# Patient Record
Sex: FEMALE | Race: WHITE | Employment: FULL TIME | URBAN - METROPOLITAN AREA
[De-identification: names, ages, dates, MRNs, and addresses within clinical notes are randomized per-mention and may not be internally consistent; named-entity substitution may affect disease eponyms.]

---

## 2017-02-15 PROBLEM — N60.19: Status: ACTIVE | Noted: 2017-02-15

## 2022-07-09 ENCOUNTER — HOSPITAL ENCOUNTER (EMERGENCY)
Age: 47
Discharge: HOME | End: 2022-07-09
Attending: EMERGENCY MEDICINE
Payer: COMMERCIAL

## 2022-07-09 VITALS
TEMPERATURE: 98 F | SYSTOLIC BLOOD PRESSURE: 130 MMHG | RESPIRATION RATE: 16 BRPM | BODY MASS INDEX: 23.32 KG/M2 | HEIGHT: 65 IN | WEIGHT: 140 LBS | OXYGEN SATURATION: 100 % | HEART RATE: 81 BPM | DIASTOLIC BLOOD PRESSURE: 72 MMHG

## 2022-07-09 DIAGNOSIS — N39.0 URINARY TRACT INFECTION WITHOUT HEMATURIA, SITE UNSPECIFIED: Primary | ICD-10-CM

## 2022-07-09 LAB
B-HCG UR QL: NEGATIVE
BILIRUB UR QL STRIP.AUTO: NEGATIVE
CLARITY UR REFRACT.AUTO: CLEAR
COLOR UR AUTO: YELLOW
GLUCOSE UR STRIP.AUTO-MCNC: NEGATIVE MG/DL
KETONES UR STRIP.AUTO-MCNC: NEGATIVE MG/DL
LEUKOCYTE ESTERASE UR QL STRIP.AUTO: NEGATIVE
NITRITE UR QL STRIP.AUTO: POSITIVE
PH UR STRIP.AUTO: 6 [PH] (ref 5–8)
PROT UR STRIP.AUTO-MCNC: NEGATIVE MG/DL
RBC UR QL AUTO: NEGATIVE
SP GR UR STRIP.AUTO: <=1.005 (ref 1–1.03)
UROBILINOGEN UR STRIP.AUTO-MCNC: 0.2 MG/DL

## 2022-07-09 PROCEDURE — 87086 URINE CULTURE/COLONY COUNT: CPT

## 2022-07-09 PROCEDURE — 99283 EMERGENCY DEPT VISIT LOW MDM: CPT

## 2022-07-09 PROCEDURE — 87086 URINE CULTURE/COLONY COUNT: CPT | Performed by: EMERGENCY MEDICINE

## 2022-07-09 PROCEDURE — 81001 URINALYSIS AUTO W/SCOPE: CPT

## 2022-07-09 PROCEDURE — 81001 URINALYSIS AUTO W/SCOPE: CPT | Performed by: EMERGENCY MEDICINE

## 2022-07-09 PROCEDURE — 81025 URINE PREGNANCY TEST: CPT

## 2022-07-09 RX ORDER — CIPROFLOXACIN 500 MG/1
500 TABLET, FILM COATED ORAL 2 TIMES DAILY
Qty: 10 TABLET | Refills: 0 | Status: SHIPPED | OUTPATIENT
Start: 2022-07-09 | End: 2022-07-14

## 2022-07-09 NOTE — ED INITIAL ASSESSMENT (HPI)
PT to the ED for evaluation of urgency, frequency and dysuria that started this afternoon. No fevers, no flank pain.

## 2022-09-20 ENCOUNTER — HOSPITAL ENCOUNTER (EMERGENCY)
Age: 47
Discharge: HOME | End: 2022-09-20
Attending: EMERGENCY MEDICINE

## 2022-09-20 VITALS
RESPIRATION RATE: 18 BRPM | BODY MASS INDEX: 23 KG/M2 | WEIGHT: 140 LBS | SYSTOLIC BLOOD PRESSURE: 115 MMHG | HEART RATE: 73 BPM | TEMPERATURE: 98 F | OXYGEN SATURATION: 98 % | DIASTOLIC BLOOD PRESSURE: 50 MMHG

## 2022-09-20 DIAGNOSIS — N30.00 ACUTE CYSTITIS WITHOUT HEMATURIA: ICD-10-CM

## 2022-09-20 DIAGNOSIS — R33.9 URINARY RETENTION: Primary | ICD-10-CM

## 2022-09-20 LAB
BILIRUB UR QL STRIP.AUTO: NEGATIVE
CLARITY UR REFRACT.AUTO: CLEAR
COLOR UR AUTO: YELLOW
GLUCOSE UR STRIP.AUTO-MCNC: NEGATIVE MG/DL
KETONES UR STRIP.AUTO-MCNC: NEGATIVE MG/DL
LEUKOCYTE ESTERASE UR QL STRIP.AUTO: NEGATIVE
NITRITE UR QL STRIP.AUTO: NEGATIVE
PH UR STRIP.AUTO: 5.5 [PH] (ref 5–8)
PROT UR STRIP.AUTO-MCNC: NEGATIVE MG/DL
SP GR UR STRIP.AUTO: 1.01 (ref 1–1.03)
UROBILINOGEN UR STRIP.AUTO-MCNC: 0.2 MG/DL

## 2022-09-20 PROCEDURE — 99283 EMERGENCY DEPT VISIT LOW MDM: CPT

## 2022-09-20 PROCEDURE — 51702 INSERT TEMP BLADDER CATH: CPT

## 2022-09-20 PROCEDURE — 81001 URINALYSIS AUTO W/SCOPE: CPT | Performed by: EMERGENCY MEDICINE

## 2022-09-20 PROCEDURE — 99284 EMERGENCY DEPT VISIT MOD MDM: CPT

## 2022-09-20 RX ORDER — CEPHALEXIN 500 MG/1
500 CAPSULE ORAL 4 TIMES DAILY
Qty: 28 CAPSULE | Refills: 0 | Status: SHIPPED | OUTPATIENT
Start: 2022-09-20 | End: 2022-09-27

## 2023-04-29 ENCOUNTER — HOSPITAL ENCOUNTER (EMERGENCY)
Age: 48
Discharge: LEFT BEFORE DISCHARGE | End: 2023-04-30
Attending: EMERGENCY MEDICINE
Payer: COMMERCIAL

## 2023-04-29 DIAGNOSIS — N85.8 UTERINE MASS: ICD-10-CM

## 2023-04-29 DIAGNOSIS — R19.03 RIGHT LOWER QUADRANT ABDOMINAL MASS: ICD-10-CM

## 2023-04-29 DIAGNOSIS — R33.9 URINARY RETENTION: Primary | ICD-10-CM

## 2023-04-29 DIAGNOSIS — D64.9 ANEMIA, UNSPECIFIED TYPE: ICD-10-CM

## 2023-04-29 LAB
BILIRUB UR QL STRIP.AUTO: NEGATIVE
CLARITY UR REFRACT.AUTO: CLEAR
COLOR UR AUTO: YELLOW
GLUCOSE UR STRIP.AUTO-MCNC: NEGATIVE MG/DL
KETONES UR STRIP.AUTO-MCNC: NEGATIVE MG/DL
LEUKOCYTE ESTERASE UR QL STRIP.AUTO: NEGATIVE
NITRITE UR QL STRIP.AUTO: NEGATIVE
PH UR STRIP.AUTO: 5 [PH] (ref 5–8)
PROT UR STRIP.AUTO-MCNC: NEGATIVE MG/DL
SP GR UR STRIP.AUTO: <=1.005 (ref 1–1.03)
UROBILINOGEN UR STRIP.AUTO-MCNC: 0.2 MG/DL

## 2023-04-29 PROCEDURE — 36415 COLL VENOUS BLD VENIPUNCTURE: CPT

## 2023-04-29 PROCEDURE — 81001 URINALYSIS AUTO W/SCOPE: CPT | Performed by: EMERGENCY MEDICINE

## 2023-04-29 PROCEDURE — 51702 INSERT TEMP BLADDER CATH: CPT

## 2023-04-29 PROCEDURE — 81001 URINALYSIS AUTO W/SCOPE: CPT

## 2023-04-29 PROCEDURE — 99285 EMERGENCY DEPT VISIT HI MDM: CPT

## 2023-04-29 PROCEDURE — 81015 MICROSCOPIC EXAM OF URINE: CPT

## 2023-04-30 ENCOUNTER — APPOINTMENT (OUTPATIENT)
Dept: CT IMAGING | Age: 48
End: 2023-04-30
Attending: EMERGENCY MEDICINE
Payer: COMMERCIAL

## 2023-04-30 VITALS
DIASTOLIC BLOOD PRESSURE: 63 MMHG | HEART RATE: 88 BPM | RESPIRATION RATE: 16 BRPM | SYSTOLIC BLOOD PRESSURE: 115 MMHG | OXYGEN SATURATION: 100 % | TEMPERATURE: 98 F | BODY MASS INDEX: 22.5 KG/M2 | HEIGHT: 66 IN | WEIGHT: 140 LBS

## 2023-04-30 LAB
ALBUMIN SERPL-MCNC: 3.5 G/DL (ref 3.4–5)
ALBUMIN/GLOB SERPL: 1 {RATIO} (ref 1–2)
ALP LIVER SERPL-CCNC: 62 U/L
ALT SERPL-CCNC: 18 U/L
ANION GAP SERPL CALC-SCNC: 3 MMOL/L (ref 0–18)
AST SERPL-CCNC: 14 U/L (ref 15–37)
BASOPHILS # BLD AUTO: 0.03 X10(3) UL (ref 0–0.2)
BASOPHILS NFR BLD AUTO: 0.5 %
BILIRUB SERPL-MCNC: 0.1 MG/DL (ref 0.1–2)
BUN BLD-MCNC: 10 MG/DL (ref 7–18)
CALCIUM BLD-MCNC: 9.4 MG/DL (ref 8.5–10.1)
CHLORIDE SERPL-SCNC: 112 MMOL/L (ref 98–112)
CO2 SERPL-SCNC: 26 MMOL/L (ref 21–32)
CREAT BLD-MCNC: 0.83 MG/DL
EOSINOPHIL # BLD AUTO: 0.01 X10(3) UL (ref 0–0.7)
EOSINOPHIL NFR BLD AUTO: 0.2 %
ERYTHROCYTE [DISTWIDTH] IN BLOOD BY AUTOMATED COUNT: 18.2 %
GFR SERPLBLD BASED ON 1.73 SQ M-ARVRAT: 86 ML/MIN/1.73M2 (ref 60–?)
GLOBULIN PLAS-MCNC: 3.6 G/DL (ref 2.8–4.4)
GLUCOSE BLD-MCNC: 101 MG/DL (ref 70–99)
HCT VFR BLD AUTO: 28 %
HGB BLD-MCNC: 8 G/DL
IMM GRANULOCYTES # BLD AUTO: 0.01 X10(3) UL (ref 0–1)
IMM GRANULOCYTES NFR BLD: 0.2 %
LYMPHOCYTES # BLD AUTO: 1.57 X10(3) UL (ref 1–4)
LYMPHOCYTES NFR BLD AUTO: 28.1 %
MCH RBC QN AUTO: 20.6 PG (ref 26–34)
MCHC RBC AUTO-ENTMCNC: 28.6 G/DL (ref 31–37)
MCV RBC AUTO: 72 FL
MONOCYTES # BLD AUTO: 0.62 X10(3) UL (ref 0.1–1)
MONOCYTES NFR BLD AUTO: 11.1 %
NEUTROPHILS # BLD AUTO: 3.35 X10 (3) UL (ref 1.5–7.7)
NEUTROPHILS # BLD AUTO: 3.35 X10(3) UL (ref 1.5–7.7)
NEUTROPHILS NFR BLD AUTO: 59.9 %
OSMOLALITY SERPL CALC.SUM OF ELEC: 291 MOSM/KG (ref 275–295)
PLATELET # BLD AUTO: 418 10(3)UL (ref 150–450)
POTASSIUM SERPL-SCNC: 4.5 MMOL/L (ref 3.5–5.1)
PROT SERPL-MCNC: 7.1 G/DL (ref 6.4–8.2)
RBC # BLD AUTO: 3.89 X10(6)UL
SODIUM SERPL-SCNC: 141 MMOL/L (ref 136–145)
WBC # BLD AUTO: 5.6 X10(3) UL (ref 4–11)

## 2023-04-30 PROCEDURE — 74177 CT ABD & PELVIS W/CONTRAST: CPT | Performed by: EMERGENCY MEDICINE

## 2023-04-30 PROCEDURE — 85025 COMPLETE CBC W/AUTO DIFF WBC: CPT | Performed by: EMERGENCY MEDICINE

## 2023-04-30 PROCEDURE — 80053 COMPREHEN METABOLIC PANEL: CPT | Performed by: EMERGENCY MEDICINE

## 2023-04-30 NOTE — DISCHARGE INSTRUCTIONS
Follow-up with gynecology as well as surgery regarding uterine and intra-abdominal mass. Recommend biopsy to rule out malignancy, especially right lower quadrant mass.

## 2023-04-30 NOTE — ED INITIAL ASSESSMENT (HPI)
Pt to ed with c/o urinary retention for about a week. States she is not able to use the washroom at work so she feels distended. Also reports dysuria. States she was taking azo which helped initially but is no longer helping. Denies fevers.

## 2023-05-03 ENCOUNTER — OFFICE VISIT (OUTPATIENT)
Dept: FAMILY MEDICINE CLINIC | Facility: CLINIC | Age: 48
End: 2023-05-03
Payer: COMMERCIAL

## 2023-05-03 VITALS
WEIGHT: 139 LBS | RESPIRATION RATE: 16 BRPM | OXYGEN SATURATION: 98 % | DIASTOLIC BLOOD PRESSURE: 80 MMHG | HEART RATE: 76 BPM | SYSTOLIC BLOOD PRESSURE: 110 MMHG | HEIGHT: 66 IN | BODY MASS INDEX: 22.34 KG/M2

## 2023-05-03 DIAGNOSIS — N20.0 KIDNEY STONES: ICD-10-CM

## 2023-05-03 DIAGNOSIS — D25.9 UTERINE LEIOMYOMA, UNSPECIFIED LOCATION: ICD-10-CM

## 2023-05-03 DIAGNOSIS — N85.2 ENLARGED UTERUS: Primary | ICD-10-CM

## 2023-05-03 DIAGNOSIS — R33.9 URINARY RETENTION: ICD-10-CM

## 2023-05-03 DIAGNOSIS — R19.07 GENERALIZED SWELLING, MASS, OR LUMP OF ABDOMEN OR PELVIS: ICD-10-CM

## 2023-05-03 PROCEDURE — 3079F DIAST BP 80-89 MM HG: CPT | Performed by: NURSE PRACTITIONER

## 2023-05-03 PROCEDURE — 3074F SYST BP LT 130 MM HG: CPT | Performed by: NURSE PRACTITIONER

## 2023-05-03 PROCEDURE — 99204 OFFICE O/P NEW MOD 45 MIN: CPT | Performed by: NURSE PRACTITIONER

## 2023-05-03 PROCEDURE — 3008F BODY MASS INDEX DOCD: CPT | Performed by: NURSE PRACTITIONER

## 2023-05-23 ENCOUNTER — HOSPITAL ENCOUNTER (OUTPATIENT)
Dept: MRI IMAGING | Facility: HOSPITAL | Age: 48
Discharge: HOME | End: 2023-05-23
Attending: NURSE PRACTITIONER
Payer: COMMERCIAL

## 2023-05-23 DIAGNOSIS — N85.2 ENLARGED UTERUS: ICD-10-CM

## 2023-05-23 DIAGNOSIS — R33.9 URINARY RETENTION: ICD-10-CM

## 2023-05-23 DIAGNOSIS — R19.07 GENERALIZED SWELLING, MASS, OR LUMP OF ABDOMEN OR PELVIS: ICD-10-CM

## 2023-05-23 DIAGNOSIS — D25.9 UTERINE LEIOMYOMA, UNSPECIFIED LOCATION: ICD-10-CM

## 2023-05-23 PROCEDURE — 72197 MRI PELVIS W/O & W/DYE: CPT | Performed by: NURSE PRACTITIONER

## 2023-05-23 PROCEDURE — A9575 INJ GADOTERATE MEGLUMI 0.1ML: HCPCS | Performed by: NURSE PRACTITIONER

## 2023-05-23 PROCEDURE — 74183 MRI ABD W/O CNTR FLWD CNTR: CPT | Performed by: NURSE PRACTITIONER

## 2023-05-23 RX ORDER — GADOTERATE MEGLUMINE 376.9 MG/ML
12 INJECTION INTRAVENOUS
Status: COMPLETED | OUTPATIENT
Start: 2023-05-23 | End: 2023-05-23

## 2023-05-23 RX ADMIN — GADOTERATE MEGLUMINE 12 ML: 376.9 INJECTION INTRAVENOUS at 15:17:00

## 2023-05-31 ENCOUNTER — TELEPHONE (OUTPATIENT)
Dept: FAMILY MEDICINE CLINIC | Facility: CLINIC | Age: 48
End: 2023-05-31

## 2023-05-31 DIAGNOSIS — D25.9 UTERINE LEIOMYOMA, UNSPECIFIED LOCATION: ICD-10-CM

## 2023-05-31 DIAGNOSIS — N85.2 ENLARGED UTERUS: Primary | ICD-10-CM

## 2023-05-31 NOTE — TELEPHONE ENCOUNTER
----- Message from SISSY Castro sent at 5/24/2023  8:23 AM CDT -----  Enlarged uterus and multiple uterine fibroids f/u with OB/GYN for further management   Needs diagnostic Mammogram order placed can schedule

## 2023-07-12 ENCOUNTER — OFFICE VISIT (OUTPATIENT)
Dept: FAMILY MEDICINE CLINIC | Facility: CLINIC | Age: 48
End: 2023-07-12
Payer: COMMERCIAL

## 2023-07-12 VITALS
RESPIRATION RATE: 16 BRPM | BODY MASS INDEX: 21.86 KG/M2 | OXYGEN SATURATION: 98 % | HEART RATE: 73 BPM | SYSTOLIC BLOOD PRESSURE: 112 MMHG | WEIGHT: 136 LBS | HEIGHT: 66 IN | DIASTOLIC BLOOD PRESSURE: 68 MMHG

## 2023-07-12 DIAGNOSIS — D25.9 UTERINE LEIOMYOMA, UNSPECIFIED LOCATION: ICD-10-CM

## 2023-07-12 DIAGNOSIS — Z00.00 LABORATORY EXAMINATION ORDERED AS PART OF A ROUTINE GENERAL MEDICAL EXAMINATION: ICD-10-CM

## 2023-07-12 DIAGNOSIS — Z00.00 WELLNESS EXAMINATION: Primary | ICD-10-CM

## 2023-07-12 DIAGNOSIS — Z12.4 SCREENING FOR CERVICAL CANCER: ICD-10-CM

## 2023-07-12 PROCEDURE — 87625 HPV TYPES 16 & 18 ONLY: CPT | Performed by: FAMILY MEDICINE

## 2023-07-12 PROCEDURE — 99396 PREV VISIT EST AGE 40-64: CPT | Performed by: FAMILY MEDICINE

## 2023-07-12 PROCEDURE — 3008F BODY MASS INDEX DOCD: CPT | Performed by: FAMILY MEDICINE

## 2023-07-12 PROCEDURE — 87624 HPV HI-RISK TYP POOLED RSLT: CPT | Performed by: FAMILY MEDICINE

## 2023-07-12 PROCEDURE — 3078F DIAST BP <80 MM HG: CPT | Performed by: FAMILY MEDICINE

## 2023-07-12 PROCEDURE — 3074F SYST BP LT 130 MM HG: CPT | Performed by: FAMILY MEDICINE

## 2023-07-12 RX ORDER — GARLIC EXTRACT 500 MG
1 CAPSULE ORAL DAILY
COMMUNITY

## 2023-07-14 ENCOUNTER — PATIENT MESSAGE (OUTPATIENT)
Dept: FAMILY MEDICINE CLINIC | Facility: CLINIC | Age: 48
End: 2023-07-14

## 2023-07-14 LAB — HPV I/H RISK 1 DNA SPEC QL NAA+PROBE: POSITIVE

## 2023-07-14 NOTE — TELEPHONE ENCOUNTER
From: Linda Herbert  To: Sd Bello MD  Sent: 7/14/2023 1:07 PM CDT  Subject: Test results     Am I reading results right (positive)  What is the next step?

## 2023-07-18 LAB
HPV16 DNA CVX QL PROBE+SIG AMP: NEGATIVE
HPV18 DNA CVX QL PROBE+SIG AMP: NEGATIVE

## 2023-07-19 ENCOUNTER — LAB ENCOUNTER (OUTPATIENT)
Dept: LAB | Age: 48
End: 2023-07-19
Attending: FAMILY MEDICINE
Payer: COMMERCIAL

## 2023-07-19 DIAGNOSIS — Z00.00 LABORATORY EXAMINATION ORDERED AS PART OF A ROUTINE GENERAL MEDICAL EXAMINATION: ICD-10-CM

## 2023-07-19 LAB
ALBUMIN SERPL-MCNC: 3.6 G/DL (ref 3.4–5)
ALBUMIN/GLOB SERPL: 1 {RATIO} (ref 1–2)
ALP LIVER SERPL-CCNC: 57 U/L
ALT SERPL-CCNC: 22 U/L
ANION GAP SERPL CALC-SCNC: 5 MMOL/L (ref 0–18)
AST SERPL-CCNC: 20 U/L (ref 15–37)
BASOPHILS # BLD AUTO: 0.02 X10(3) UL (ref 0–0.2)
BASOPHILS NFR BLD AUTO: 0.4 %
BILIRUB SERPL-MCNC: 0.3 MG/DL (ref 0.1–2)
BUN BLD-MCNC: 13 MG/DL (ref 7–18)
CALCIUM BLD-MCNC: 9.3 MG/DL (ref 8.5–10.1)
CHLORIDE SERPL-SCNC: 111 MMOL/L (ref 98–112)
CHOLEST SERPL-MCNC: 168 MG/DL (ref ?–200)
CO2 SERPL-SCNC: 24 MMOL/L (ref 21–32)
CREAT BLD-MCNC: 0.55 MG/DL
EOSINOPHIL # BLD AUTO: 0 X10(3) UL (ref 0–0.7)
EOSINOPHIL NFR BLD AUTO: 0 %
ERYTHROCYTE [DISTWIDTH] IN BLOOD BY AUTOMATED COUNT: 18.1 %
FASTING PATIENT LIPID ANSWER: YES
FASTING STATUS PATIENT QL REPORTED: YES
GFR SERPLBLD BASED ON 1.73 SQ M-ARVRAT: 112 ML/MIN/1.73M2 (ref 60–?)
GLOBULIN PLAS-MCNC: 3.5 G/DL (ref 2.8–4.4)
GLUCOSE BLD-MCNC: 88 MG/DL (ref 70–99)
HCT VFR BLD AUTO: 30 %
HDLC SERPL-MCNC: 63 MG/DL (ref 40–59)
HGB BLD-MCNC: 8.2 G/DL
IMM GRANULOCYTES # BLD AUTO: 0 X10(3) UL (ref 0–1)
IMM GRANULOCYTES NFR BLD: 0 %
LDLC SERPL CALC-MCNC: 95 MG/DL (ref ?–100)
LYMPHOCYTES # BLD AUTO: 1.31 X10(3) UL (ref 1–4)
LYMPHOCYTES NFR BLD AUTO: 28.1 %
MCH RBC QN AUTO: 19.8 PG (ref 26–34)
MCHC RBC AUTO-ENTMCNC: 27.3 G/DL (ref 31–37)
MCV RBC AUTO: 72.5 FL
MONOCYTES # BLD AUTO: 0.43 X10(3) UL (ref 0.1–1)
MONOCYTES NFR BLD AUTO: 9.2 %
NEUTROPHILS # BLD AUTO: 2.91 X10 (3) UL (ref 1.5–7.7)
NEUTROPHILS # BLD AUTO: 2.91 X10(3) UL (ref 1.5–7.7)
NEUTROPHILS NFR BLD AUTO: 62.3 %
NONHDLC SERPL-MCNC: 105 MG/DL (ref ?–130)
OSMOLALITY SERPL CALC.SUM OF ELEC: 290 MOSM/KG (ref 275–295)
PLATELET # BLD AUTO: 455 10(3)UL (ref 150–450)
POTASSIUM SERPL-SCNC: 4.8 MMOL/L (ref 3.5–5.1)
PROT SERPL-MCNC: 7.1 G/DL (ref 6.4–8.2)
RBC # BLD AUTO: 4.14 X10(6)UL
SODIUM SERPL-SCNC: 140 MMOL/L (ref 136–145)
TRIGL SERPL-MCNC: 50 MG/DL (ref 30–149)
TSI SER-ACNC: 0.92 MIU/ML (ref 0.36–3.74)
VLDLC SERPL CALC-MCNC: 8 MG/DL (ref 0–30)
WBC # BLD AUTO: 4.7 X10(3) UL (ref 4–11)

## 2023-07-19 PROCEDURE — 84443 ASSAY THYROID STIM HORMONE: CPT

## 2023-07-19 PROCEDURE — 85025 COMPLETE CBC W/AUTO DIFF WBC: CPT

## 2023-07-19 PROCEDURE — 36415 COLL VENOUS BLD VENIPUNCTURE: CPT

## 2023-07-19 PROCEDURE — 80061 LIPID PANEL: CPT

## 2023-07-19 PROCEDURE — 80053 COMPREHEN METABOLIC PANEL: CPT

## 2023-07-20 ENCOUNTER — HOSPITAL ENCOUNTER (EMERGENCY)
Age: 48
Discharge: HOME | End: 2023-07-20
Attending: EMERGENCY MEDICINE
Payer: COMMERCIAL

## 2023-07-20 ENCOUNTER — PATIENT OUTREACH (OUTPATIENT)
Dept: CASE MANAGEMENT | Age: 48
End: 2023-07-20

## 2023-07-20 VITALS
OXYGEN SATURATION: 100 % | WEIGHT: 136 LBS | DIASTOLIC BLOOD PRESSURE: 72 MMHG | HEART RATE: 81 BPM | BODY MASS INDEX: 21.86 KG/M2 | TEMPERATURE: 98 F | HEIGHT: 66 IN | RESPIRATION RATE: 18 BRPM | SYSTOLIC BLOOD PRESSURE: 126 MMHG

## 2023-07-20 DIAGNOSIS — R33.9 URINARY RETENTION: Primary | ICD-10-CM

## 2023-07-20 LAB
BILIRUB UR QL STRIP.AUTO: NEGATIVE
CLARITY UR REFRACT.AUTO: CLEAR
COLOR UR AUTO: YELLOW
GLUCOSE UR STRIP.AUTO-MCNC: NEGATIVE MG/DL
KETONES UR STRIP.AUTO-MCNC: NEGATIVE MG/DL
LEUKOCYTE ESTERASE UR QL STRIP.AUTO: NEGATIVE
NITRITE UR QL STRIP.AUTO: POSITIVE
PH UR STRIP.AUTO: 5.5 [PH] (ref 5–8)
PROT UR STRIP.AUTO-MCNC: NEGATIVE MG/DL
RBC UR QL AUTO: NEGATIVE
SP GR UR STRIP.AUTO: <=1.005 (ref 1–1.03)
UROBILINOGEN UR STRIP.AUTO-MCNC: 0.2 MG/DL

## 2023-07-20 PROCEDURE — 51798 US URINE CAPACITY MEASURE: CPT

## 2023-07-20 PROCEDURE — 81015 MICROSCOPIC EXAM OF URINE: CPT | Performed by: EMERGENCY MEDICINE

## 2023-07-20 PROCEDURE — 87086 URINE CULTURE/COLONY COUNT: CPT | Performed by: EMERGENCY MEDICINE

## 2023-07-20 PROCEDURE — 81001 URINALYSIS AUTO W/SCOPE: CPT | Performed by: EMERGENCY MEDICINE

## 2023-07-20 PROCEDURE — 99284 EMERGENCY DEPT VISIT MOD MDM: CPT

## 2023-07-20 NOTE — PROGRESS NOTES
1st attempt ED f/up apt request   PCP -decline, pt stated she will follow up w/ OBGYN instead  URO-decline, pt stated she already spoke to [facility] and theres nothing they can do for her  Closing encounter

## 2023-07-20 NOTE — ED INITIAL ASSESSMENT (HPI)
Patient to ER with c/o urinary retention, per patient last void was yesterday. Patient with c/o distention, only able to pee \"a couple of drops\" at a time. Patient states, \"I was seen in the ER last for the same problem about 60 days ago\".

## 2023-07-22 ENCOUNTER — TELEPHONE (OUTPATIENT)
Dept: FAMILY MEDICINE CLINIC | Facility: CLINIC | Age: 48
End: 2023-07-22

## 2023-07-22 NOTE — TELEPHONE ENCOUNTER
----- Message from Abeba Lynch MD sent at 7/20/2023  2:43 PM CDT -----  Labs stable. Anemia 2/2 fibroids. Has f/u scheduled with gyn in 08/23.

## 2023-07-22 NOTE — TELEPHONE ENCOUNTER
----- Message from Marilyn Bland MD sent at 7/17/2023  1:04 PM CDT -----  HPV positive, pending papsmear results.

## 2023-07-24 ENCOUNTER — TELEPHONE (OUTPATIENT)
Dept: OBGYN CLINIC | Facility: CLINIC | Age: 48
End: 2023-07-24

## 2023-07-24 NOTE — TELEPHONE ENCOUNTER
Spoke to patient and discussed provider's recommendations. Patient to discuss surgery, have Colpo and EMB. Instructions discussed. No further questions. Patient will need a work note.

## 2023-08-18 ENCOUNTER — OFFICE VISIT (OUTPATIENT)
Dept: OBGYN CLINIC | Facility: CLINIC | Age: 48
End: 2023-08-18
Payer: COMMERCIAL

## 2023-08-18 VITALS
WEIGHT: 139.63 LBS | HEIGHT: 66 IN | SYSTOLIC BLOOD PRESSURE: 102 MMHG | DIASTOLIC BLOOD PRESSURE: 68 MMHG | BODY MASS INDEX: 22.44 KG/M2

## 2023-08-18 DIAGNOSIS — R87.810 ASCUS WITH POSITIVE HIGH RISK HPV CERVICAL: Primary | ICD-10-CM

## 2023-08-18 DIAGNOSIS — R87.610 ASCUS WITH POSITIVE HIGH RISK HPV CERVICAL: Primary | ICD-10-CM

## 2023-08-18 DIAGNOSIS — R33.9 URINARY RETENTION: ICD-10-CM

## 2023-08-18 DIAGNOSIS — D25.9 UTERINE LEIOMYOMA, UNSPECIFIED LOCATION: ICD-10-CM

## 2023-08-18 DIAGNOSIS — Z01.818 PREPROCEDURAL EXAMINATION: ICD-10-CM

## 2023-08-18 LAB
CONTROL LINE PRESENT WITH A CLEAR BACKGROUND (YES/NO): YES YES/NO
KIT LOT #: NORMAL NUMERIC
PREGNANCY TEST, URINE: NEGATIVE

## 2023-08-18 PROCEDURE — 88305 TISSUE EXAM BY PATHOLOGIST: CPT | Performed by: OBSTETRICS & GYNECOLOGY

## 2023-08-18 NOTE — PROCEDURES
Colposcopy Procedure Note    Date of Procedure: 08/18/23    Indications: 50year old y/o female with ASCUS HPV positive. Reports history of abnormal Pap smear status post cryotherapy about 27 years ago. Pre-procedure diagnosis:   ASCUS HPV positive    Post-procedure diagnosis:  ASCUS HPV positive    Procedure:  Colposcopy   Cervical Biopsy   ECC     Procedure Details:   A discussion was held with patient including diagnosis, prognosis and management. Recommendation made for colposcopy with possible biopsies. Risks, benefits and alteratives were discussed with the patient. The patient was agreed to proceed with procedure. She provided written and verbal consent. The patient was positioned supine and in stir-ups. The sterile speculum was placed in the vagina and the cervix was difficult to visualize completely due to extreme anterior deviation of the cervix behind the pubic bone secondary to the patient's enlarged fibroid uterus. Therefore, there was some limitation to the visualization of the cervix. The vagina appeared normal with no lesions or discolorations noted. The cervix was then swabbed with sterile saline and no lesions seen under gross examination. Green light filter was negative. After application of acetic acid, no white feathery acetowhite changes were noted. Please refer to image provided below. No mocaism, no punctations seen on gross examination. This findings were consistent after the application of Lugol's solution. A random biopsy was then collected at the 11 o'clock position. The transformation zone was not fully visualized. No lesions noted. unsatisfactory Colposcopy. Therefore, ECC was collected. Biopsy sites were examined. Silver Nitrate and Monsel's solution was applied to the cervix. Good hemostasis noted. All instruments were removed from the vagina. All tissue were placed into the designated specimen containers and collected to be sent to pathology. Impression: SILVINA 1 or normal pending final pathology     Disposition: Stable. RTC in 1 year for well woman exam or sooner if needed. Zehra Yoon MD   EMG - OBGYN    Discussed with patient that there will not be further notification of normal or benign results other than receiving results on [employer]. A [employer] message or telephone call will be placed by the physician and/or office staff if results are abnormal.       Note to patient and family   The [address] [place] [address] makes medical notes available to patients in the interest of transparency. However, please be advised that this is a medical document. It is intended as evkh-sh-lmji communication. It is written and medical language may contain abbreviations or verbiage that are technical and unfamiliar. It may appear blunt or direct. Medical documents are intended to carry relevant information, facts as evident, and the clinical opinion of the practitioner. This note could include assistance by [employer] recognition.  Errors in content may be related to improper recognition by the system; efforts to review and correct have been done but errors may still exist.

## 2023-08-18 NOTE — PATIENT INSTRUCTIONS
Gynecology Oncology     [facility]   Dr. Itzel Moyer   [phone] - [place] Montero Sa  [phone]    [facility] (Also performs surgery at [facility])   Dr. Danielle Olivares   [phone] [facility] Location)   Dr. Green Mail   [phone]    Dr. Clara Pacheco   [phone]  (office in [place])       ------------------------------      Minimally invasive gynecological surgeons who specializes in complex pelvic surgery  Dr. Thiaog Urban, Dr. Emily Braun  Telephone [phone]    Dr. Seun Logan [phone]    Herson Martinez Pen  [phone]    Dr. Dileep Paris  Telephone [phone]    Dr. Nasrin Metz [phone]    Dr. Caro George   Telephone [phone]    Dr. Ceci Blake [phone]    Dr. Ronit Bonner [phone]    Dr. Marleen Galeazzi [phone]

## 2023-08-21 ENCOUNTER — PATIENT MESSAGE (OUTPATIENT)
Dept: OBGYN CLINIC | Facility: CLINIC | Age: 48
End: 2023-08-21

## 2023-08-22 ENCOUNTER — TELEPHONE (OUTPATIENT)
Dept: OBGYN CLINIC | Facility: CLINIC | Age: 48
End: 2023-08-22

## 2023-08-22 NOTE — TELEPHONE ENCOUNTER
Pt was referred to another Dr. See referral and their office told pt she can't schedule appt that they will call her when they have the referral and records but they dont have anything. Pt is confused about what is going on and doesn't know what to do . Please call and discuss with her.

## 2023-08-23 NOTE — TELEPHONE ENCOUNTER
Spoke to pt again today she is looking for information regarding this issue. She would like to speak with someone today.  Thank you

## 2023-08-23 NOTE — TELEPHONE ENCOUNTER
Contacted patient by phone. Will fax records to Dr. Braydon Phoenix today.     Records faxed to Dr. Braydon Phoenix at [phone]

## 2023-08-23 NOTE — TELEPHONE ENCOUNTER
From: Zach De La Torre  To: Pricila Post MD  Sent: 8/21/2023 1:50 PM CDT  Subject: Navin Sheldon trying to talk to someone   The doctor Vanesa Abel does not have my chart   They are asking for all paperwork/tests and referral sent to them   Then they will contact me for a appointment   Also how do I get a # so I can talk to someone at your office

## 2023-08-29 ENCOUNTER — TELEPHONE (OUTPATIENT)
Dept: OBGYN CLINIC | Facility: CLINIC | Age: 48
End: 2023-08-29

## 2023-09-01 ENCOUNTER — TELEPHONE (OUTPATIENT)
Dept: FAMILY MEDICINE CLINIC | Facility: CLINIC | Age: 48
End: 2023-09-01

## 2023-09-05 ENCOUNTER — TELEPHONE (OUTPATIENT)
Dept: FAMILY MEDICINE CLINIC | Facility: CLINIC | Age: 48
End: 2023-09-05

## 2023-09-05 NOTE — TELEPHONE ENCOUNTER
Patient scheduled for pre-op with Reena Yanes on 09/21/23. Patient scheduled for surgery on 09/29/23 at [facility] and surgeon is Dr. Shanna Correa. Received pre-op order, created pink sheet and routed to Ausra's bin.

## 2023-09-11 ENCOUNTER — TELEPHONE (OUTPATIENT)
Dept: OBGYN CLINIC | Facility: CLINIC | Age: 48
End: 2023-09-11

## 2023-09-11 ENCOUNTER — TELEPHONE (OUTPATIENT)
Dept: FAMILY MEDICINE CLINIC | Facility: CLINIC | Age: 48
End: 2023-09-11

## 2023-09-11 NOTE — TELEPHONE ENCOUNTER
Gynecology - oncology consult report dated 9/10/2023 was received via fax from [address] [facility]. Report placed in Dr Lora Lazcano desk pending review.

## 2023-09-11 NOTE — TELEPHONE ENCOUNTER
Medical records reviewed. Patient seen by gynecology oncology. Patient recommended total abdominal hysterectomy with bilateral salpingectomy due to enlarged uterus secondary to uterine fibroids.   Patient to be scheduled for surgery with

## 2023-09-21 ENCOUNTER — OFFICE VISIT (OUTPATIENT)
Dept: FAMILY MEDICINE CLINIC | Facility: CLINIC | Age: 48
End: 2023-09-21
Payer: COMMERCIAL

## 2023-09-21 ENCOUNTER — HOSPITAL ENCOUNTER (OUTPATIENT)
Dept: GENERAL RADIOLOGY | Age: 48
Discharge: HOME | End: 2023-09-21
Attending: NURSE PRACTITIONER
Payer: COMMERCIAL

## 2023-09-21 ENCOUNTER — MED REC SCAN ONLY (OUTPATIENT)
Dept: FAMILY MEDICINE CLINIC | Facility: CLINIC | Age: 48
End: 2023-09-21

## 2023-09-21 ENCOUNTER — LAB ENCOUNTER (OUTPATIENT)
Dept: LAB | Age: 48
End: 2023-09-21
Attending: NURSE PRACTITIONER
Payer: COMMERCIAL

## 2023-09-21 VITALS
DIASTOLIC BLOOD PRESSURE: 70 MMHG | RESPIRATION RATE: 16 BRPM | SYSTOLIC BLOOD PRESSURE: 120 MMHG | HEART RATE: 72 BPM | WEIGHT: 141 LBS | BODY MASS INDEX: 22.66 KG/M2 | HEIGHT: 66 IN | OXYGEN SATURATION: 100 %

## 2023-09-21 DIAGNOSIS — Z01.818 PRE-OP TESTING: ICD-10-CM

## 2023-09-21 DIAGNOSIS — Z01.818 PREOPERATIVE GENERAL PHYSICAL EXAMINATION: Primary | ICD-10-CM

## 2023-09-21 DIAGNOSIS — N85.2 ENLARGED UTERUS: ICD-10-CM

## 2023-09-21 DIAGNOSIS — D25.9 UTERINE LEIOMYOMA, UNSPECIFIED LOCATION: ICD-10-CM

## 2023-09-21 DIAGNOSIS — D64.9 ANEMIA, UNSPECIFIED TYPE: ICD-10-CM

## 2023-09-21 LAB
ALBUMIN SERPL-MCNC: 3.7 G/DL (ref 3.4–5)
ALBUMIN/GLOB SERPL: 0.9 {RATIO} (ref 1–2)
ALP LIVER SERPL-CCNC: 60 U/L
ALT SERPL-CCNC: 24 U/L
ANION GAP SERPL CALC-SCNC: 4 MMOL/L (ref 0–18)
APTT PPP: 26.6 SECONDS (ref 23.3–35.6)
AST SERPL-CCNC: 15 U/L (ref 15–37)
ATRIAL RATE: 73 BPM
BASOPHILS # BLD AUTO: 0.02 X10(3) UL (ref 0–0.2)
BASOPHILS NFR BLD AUTO: 0.4 %
BILIRUB SERPL-MCNC: 0.3 MG/DL (ref 0.1–2)
BUN BLD-MCNC: 14 MG/DL (ref 7–18)
CALCIUM BLD-MCNC: 9.4 MG/DL (ref 8.5–10.1)
CHLORIDE SERPL-SCNC: 110 MMOL/L (ref 98–112)
CO2 SERPL-SCNC: 25 MMOL/L (ref 21–32)
CREAT BLD-MCNC: 0.55 MG/DL
EGFRCR SERPLBLD CKD-EPI 2021: 112 ML/MIN/1.73M2 (ref 60–?)
EOSINOPHIL # BLD AUTO: 0.02 X10(3) UL (ref 0–0.7)
EOSINOPHIL NFR BLD AUTO: 0.4 %
ERYTHROCYTE [DISTWIDTH] IN BLOOD BY AUTOMATED COUNT: 18.8 %
FASTING STATUS PATIENT QL REPORTED: NO
GLOBULIN PLAS-MCNC: 4 G/DL (ref 2.8–4.4)
GLUCOSE BLD-MCNC: 97 MG/DL (ref 70–99)
HCT VFR BLD AUTO: 31 %
HGB BLD-MCNC: 8.7 G/DL
IMM GRANULOCYTES # BLD AUTO: 0.01 X10(3) UL (ref 0–1)
IMM GRANULOCYTES NFR BLD: 0.2 %
INR BLD: 1.01 (ref 0.85–1.16)
LYMPHOCYTES # BLD AUTO: 1.32 X10(3) UL (ref 1–4)
LYMPHOCYTES NFR BLD AUTO: 27 %
MCH RBC QN AUTO: 19.8 PG (ref 26–34)
MCHC RBC AUTO-ENTMCNC: 28.1 G/DL (ref 31–37)
MCV RBC AUTO: 70.6 FL
MONOCYTES # BLD AUTO: 0.5 X10(3) UL (ref 0.1–1)
MONOCYTES NFR BLD AUTO: 10.2 %
NEUTROPHILS # BLD AUTO: 3.02 X10 (3) UL (ref 1.5–7.7)
NEUTROPHILS # BLD AUTO: 3.02 X10(3) UL (ref 1.5–7.7)
NEUTROPHILS NFR BLD AUTO: 61.8 %
OSMOLALITY SERPL CALC.SUM OF ELEC: 288 MOSM/KG (ref 275–295)
P AXIS: 36 DEGREES
P-R INTERVAL: 158 MS
PLATELET # BLD AUTO: 374 10(3)UL (ref 150–450)
POTASSIUM SERPL-SCNC: 3.9 MMOL/L (ref 3.5–5.1)
PROT SERPL-MCNC: 7.7 G/DL (ref 6.4–8.2)
PROTHROMBIN TIME: 13.3 SECONDS (ref 11.6–14.8)
Q-T INTERVAL: 378 MS
QRS DURATION: 80 MS
QTC CALCULATION (BEZET): 416 MS
R AXIS: 51 DEGREES
RBC # BLD AUTO: 4.39 X10(6)UL
SODIUM SERPL-SCNC: 139 MMOL/L (ref 136–145)
T AXIS: 34 DEGREES
VENTRICULAR RATE: 73 BPM
WBC # BLD AUTO: 4.9 X10(3) UL (ref 4–11)

## 2023-09-21 PROCEDURE — 36415 COLL VENOUS BLD VENIPUNCTURE: CPT

## 2023-09-21 PROCEDURE — 3078F DIAST BP <80 MM HG: CPT | Performed by: NURSE PRACTITIONER

## 2023-09-21 PROCEDURE — 99214 OFFICE O/P EST MOD 30 MIN: CPT | Performed by: NURSE PRACTITIONER

## 2023-09-21 PROCEDURE — 85025 COMPLETE CBC W/AUTO DIFF WBC: CPT

## 2023-09-21 PROCEDURE — 71046 X-RAY EXAM CHEST 2 VIEWS: CPT | Performed by: NURSE PRACTITIONER

## 2023-09-21 PROCEDURE — 82728 ASSAY OF FERRITIN: CPT

## 2023-09-21 PROCEDURE — 3074F SYST BP LT 130 MM HG: CPT | Performed by: NURSE PRACTITIONER

## 2023-09-21 PROCEDURE — 80053 COMPREHEN METABOLIC PANEL: CPT

## 2023-09-21 PROCEDURE — 83550 IRON BINDING TEST: CPT

## 2023-09-21 PROCEDURE — 3008F BODY MASS INDEX DOCD: CPT | Performed by: NURSE PRACTITIONER

## 2023-09-21 PROCEDURE — 85730 THROMBOPLASTIN TIME PARTIAL: CPT

## 2023-09-21 PROCEDURE — 83540 ASSAY OF IRON: CPT

## 2023-09-21 PROCEDURE — 93000 ELECTROCARDIOGRAM COMPLETE: CPT | Performed by: NURSE PRACTITIONER

## 2023-09-21 PROCEDURE — 85610 PROTHROMBIN TIME: CPT

## 2023-09-23 LAB
DEPRECATED HBV CORE AB SER IA-ACNC: 1.8 NG/ML
IRON SATN MFR SERPL: 3 %
IRON SERPL-MCNC: 15 UG/DL
TIBC SERPL-MCNC: 520 UG/DL (ref 240–450)
TRANSFERRIN SERPL-MCNC: 349 MG/DL (ref 200–360)

## 2023-09-27 ENCOUNTER — TELEPHONE (OUTPATIENT)
Dept: FAMILY MEDICINE CLINIC | Facility: CLINIC | Age: 48
End: 2023-09-27

## 2023-09-27 ENCOUNTER — LAB ENCOUNTER (OUTPATIENT)
Dept: LAB | Age: 48
End: 2023-09-27
Attending: OBSTETRICS & GYNECOLOGY
Payer: COMMERCIAL

## 2023-09-27 DIAGNOSIS — Z01.818 PRE-OP TESTING: ICD-10-CM

## 2023-09-27 LAB
ANTIBODY SCREEN: NEGATIVE
RH BLOOD TYPE: POSITIVE
RH BLOOD TYPE: POSITIVE

## 2023-09-27 PROCEDURE — 86850 RBC ANTIBODY SCREEN: CPT

## 2023-09-27 PROCEDURE — 86900 BLOOD TYPING SEROLOGIC ABO: CPT

## 2023-09-27 PROCEDURE — 86901 BLOOD TYPING SEROLOGIC RH(D): CPT

## 2023-09-28 ENCOUNTER — ANESTHESIA EVENT (OUTPATIENT)
Dept: SURGERY | Facility: HOSPITAL | Age: 48
DRG: 742 | End: 2023-09-28
Payer: COMMERCIAL

## 2023-09-29 ENCOUNTER — ANESTHESIA (OUTPATIENT)
Dept: SURGERY | Facility: HOSPITAL | Age: 48
DRG: 742 | End: 2023-09-29
Payer: COMMERCIAL

## 2023-09-29 ENCOUNTER — HOSPITAL ENCOUNTER (INPATIENT)
Facility: HOSPITAL | Age: 48
LOS: 4 days | Discharge: HOME | DRG: 742 | End: 2023-10-03
Attending: OBSTETRICS & GYNECOLOGY | Admitting: OBSTETRICS & GYNECOLOGY
Payer: COMMERCIAL

## 2023-09-29 DIAGNOSIS — Z01.818 PRE-OP TESTING: Primary | ICD-10-CM

## 2023-09-29 DIAGNOSIS — G57.21 FEMORAL NERVE PALSY, RIGHT: ICD-10-CM

## 2023-09-29 LAB — B-HCG UR QL: NEGATIVE

## 2023-09-29 PROCEDURE — 0UT70ZZ RESECTION OF BILATERAL FALLOPIAN TUBES, OPEN APPROACH: ICD-10-PCS | Performed by: OBSTETRICS & GYNECOLOGY

## 2023-09-29 PROCEDURE — 0DBU0ZX EXCISION OF OMENTUM, OPEN APPROACH, DIAGNOSTIC: ICD-10-PCS | Performed by: OBSTETRICS & GYNECOLOGY

## 2023-09-29 PROCEDURE — 0UT90ZZ RESECTION OF UTERUS, OPEN APPROACH: ICD-10-PCS | Performed by: OBSTETRICS & GYNECOLOGY

## 2023-09-29 PROCEDURE — 0UT20ZZ RESECTION OF BILATERAL OVARIES, OPEN APPROACH: ICD-10-PCS | Performed by: OBSTETRICS & GYNECOLOGY

## 2023-09-29 PROCEDURE — 99232 SBSQ HOSP IP/OBS MODERATE 35: CPT | Performed by: HOSPITALIST

## 2023-09-29 RX ORDER — MORPHINE SULFATE 4 MG/ML
2 INJECTION, SOLUTION INTRAMUSCULAR; INTRAVENOUS EVERY 10 MIN PRN
Status: DISCONTINUED | OUTPATIENT
Start: 2023-09-29 | End: 2023-09-29 | Stop reason: HOSPADM

## 2023-09-29 RX ORDER — EPHEDRINE SULFATE 50 MG/ML
INJECTION, SOLUTION INTRAVENOUS AS NEEDED
Status: DISCONTINUED | OUTPATIENT
Start: 2023-09-29 | End: 2023-09-29 | Stop reason: SURG

## 2023-09-29 RX ORDER — ONDANSETRON 2 MG/ML
4 INJECTION INTRAMUSCULAR; INTRAVENOUS EVERY 8 HOURS PRN
Status: DISCONTINUED | OUTPATIENT
Start: 2023-09-29 | End: 2023-10-03

## 2023-09-29 RX ORDER — PROCHLORPERAZINE EDISYLATE 5 MG/ML
5 INJECTION INTRAMUSCULAR; INTRAVENOUS EVERY 8 HOURS PRN
Status: DISCONTINUED | OUTPATIENT
Start: 2023-09-29 | End: 2023-09-29 | Stop reason: HOSPADM

## 2023-09-29 RX ORDER — MORPHINE SULFATE 2 MG/ML
2 INJECTION, SOLUTION INTRAMUSCULAR; INTRAVENOUS EVERY 2 HOUR PRN
Status: DISCONTINUED | OUTPATIENT
Start: 2023-09-29 | End: 2023-10-03

## 2023-09-29 RX ORDER — MORPHINE SULFATE 4 MG/ML
4 INJECTION, SOLUTION INTRAMUSCULAR; INTRAVENOUS EVERY 2 HOUR PRN
Status: DISCONTINUED | OUTPATIENT
Start: 2023-09-29 | End: 2023-10-03

## 2023-09-29 RX ORDER — SODIUM CHLORIDE, SODIUM LACTATE, POTASSIUM CHLORIDE, CALCIUM CHLORIDE 600; 310; 30; 20 MG/100ML; MG/100ML; MG/100ML; MG/100ML
INJECTION, SOLUTION INTRAVENOUS CONTINUOUS
Status: DISCONTINUED | OUTPATIENT
Start: 2023-09-29 | End: 2023-09-29 | Stop reason: HOSPADM

## 2023-09-29 RX ORDER — HYDROCODONE BITARTRATE AND ACETAMINOPHEN 5; 325 MG/1; MG/1
1 TABLET ORAL EVERY 6 HOURS PRN
Status: DISCONTINUED | OUTPATIENT
Start: 2023-09-29 | End: 2023-10-03

## 2023-09-29 RX ORDER — IBUPROFEN 600 MG/1
600 TABLET ORAL EVERY 8 HOURS PRN
Qty: 60 TABLET | Refills: 0 | Status: SHIPPED | OUTPATIENT
Start: 2023-09-29

## 2023-09-29 RX ORDER — HYDROMORPHONE HYDROCHLORIDE 1 MG/ML
0.4 INJECTION, SOLUTION INTRAMUSCULAR; INTRAVENOUS; SUBCUTANEOUS EVERY 5 MIN PRN
Status: DISCONTINUED | OUTPATIENT
Start: 2023-09-29 | End: 2023-09-29 | Stop reason: HOSPADM

## 2023-09-29 RX ORDER — ENOXAPARIN SODIUM 100 MG/ML
40 INJECTION SUBCUTANEOUS DAILY
Status: DISCONTINUED | OUTPATIENT
Start: 2023-09-30 | End: 2023-10-03

## 2023-09-29 RX ORDER — MORPHINE SULFATE 10 MG/ML
6 INJECTION, SOLUTION INTRAMUSCULAR; INTRAVENOUS EVERY 10 MIN PRN
Status: DISCONTINUED | OUTPATIENT
Start: 2023-09-29 | End: 2023-09-29 | Stop reason: HOSPADM

## 2023-09-29 RX ORDER — DEXAMETHASONE SODIUM PHOSPHATE 4 MG/ML
VIAL (ML) INJECTION AS NEEDED
Status: DISCONTINUED | OUTPATIENT
Start: 2023-09-29 | End: 2023-09-29 | Stop reason: SURG

## 2023-09-29 RX ORDER — SODIUM CHLORIDE, SODIUM LACTATE, POTASSIUM CHLORIDE, CALCIUM CHLORIDE 600; 310; 30; 20 MG/100ML; MG/100ML; MG/100ML; MG/100ML
INJECTION, SOLUTION INTRAVENOUS CONTINUOUS
Status: DISCONTINUED | OUTPATIENT
Start: 2023-09-29 | End: 2023-10-02

## 2023-09-29 RX ORDER — DOCUSATE SODIUM 100 MG/1
100 CAPSULE, LIQUID FILLED ORAL 2 TIMES DAILY PRN
Qty: 60 CAPSULE | Refills: 0 | Status: SHIPPED | OUTPATIENT
Start: 2023-09-29

## 2023-09-29 RX ORDER — ONDANSETRON 2 MG/ML
4 INJECTION INTRAMUSCULAR; INTRAVENOUS EVERY 6 HOURS PRN
Status: DISCONTINUED | OUTPATIENT
Start: 2023-09-29 | End: 2023-09-29 | Stop reason: HOSPADM

## 2023-09-29 RX ORDER — ONDANSETRON 2 MG/ML
INJECTION INTRAMUSCULAR; INTRAVENOUS AS NEEDED
Status: DISCONTINUED | OUTPATIENT
Start: 2023-09-29 | End: 2023-09-29 | Stop reason: SURG

## 2023-09-29 RX ORDER — PHENYLEPHRINE HCL 10 MG/ML
VIAL (ML) INJECTION AS NEEDED
Status: DISCONTINUED | OUTPATIENT
Start: 2023-09-29 | End: 2023-09-29 | Stop reason: SURG

## 2023-09-29 RX ORDER — MORPHINE SULFATE 4 MG/ML
4 INJECTION, SOLUTION INTRAMUSCULAR; INTRAVENOUS EVERY 10 MIN PRN
Status: DISCONTINUED | OUTPATIENT
Start: 2023-09-29 | End: 2023-09-29 | Stop reason: HOSPADM

## 2023-09-29 RX ORDER — MORPHINE SULFATE 2 MG/ML
1 INJECTION, SOLUTION INTRAMUSCULAR; INTRAVENOUS EVERY 2 HOUR PRN
Status: DISCONTINUED | OUTPATIENT
Start: 2023-09-29 | End: 2023-10-03

## 2023-09-29 RX ORDER — NALOXONE HYDROCHLORIDE 0.4 MG/ML
0.08 INJECTION, SOLUTION INTRAMUSCULAR; INTRAVENOUS; SUBCUTANEOUS AS NEEDED
Status: DISCONTINUED | OUTPATIENT
Start: 2023-09-29 | End: 2023-09-29 | Stop reason: HOSPADM

## 2023-09-29 RX ORDER — HYDROMORPHONE HYDROCHLORIDE 1 MG/ML
INJECTION, SOLUTION INTRAMUSCULAR; INTRAVENOUS; SUBCUTANEOUS AS NEEDED
Status: DISCONTINUED | OUTPATIENT
Start: 2023-09-29 | End: 2023-09-29 | Stop reason: SURG

## 2023-09-29 RX ORDER — HYDROMORPHONE HYDROCHLORIDE 1 MG/ML
0.2 INJECTION, SOLUTION INTRAMUSCULAR; INTRAVENOUS; SUBCUTANEOUS EVERY 5 MIN PRN
Status: DISCONTINUED | OUTPATIENT
Start: 2023-09-29 | End: 2023-09-29 | Stop reason: HOSPADM

## 2023-09-29 RX ORDER — HYDROCODONE BITARTRATE AND ACETAMINOPHEN 5; 325 MG/1; MG/1
1 TABLET ORAL EVERY 6 HOURS PRN
Qty: 20 TABLET | Refills: 0 | Status: SHIPPED | OUTPATIENT
Start: 2023-09-29 | End: 2023-10-03

## 2023-09-29 RX ORDER — ACETAMINOPHEN 500 MG
1000 TABLET ORAL ONCE
Status: COMPLETED | OUTPATIENT
Start: 2023-09-29 | End: 2023-09-29

## 2023-09-29 RX ORDER — LIDOCAINE HYDROCHLORIDE 10 MG/ML
INJECTION, SOLUTION EPIDURAL; INFILTRATION; INTRACAUDAL; PERINEURAL AS NEEDED
Status: DISCONTINUED | OUTPATIENT
Start: 2023-09-29 | End: 2023-09-29 | Stop reason: SURG

## 2023-09-29 RX ORDER — HYDROMORPHONE HYDROCHLORIDE 1 MG/ML
0.6 INJECTION, SOLUTION INTRAMUSCULAR; INTRAVENOUS; SUBCUTANEOUS EVERY 5 MIN PRN
Status: DISCONTINUED | OUTPATIENT
Start: 2023-09-29 | End: 2023-09-29 | Stop reason: HOSPADM

## 2023-09-29 RX ORDER — CEFAZOLIN SODIUM/WATER 2 G/20 ML
2 SYRINGE (ML) INTRAVENOUS ONCE
Status: COMPLETED | OUTPATIENT
Start: 2023-09-29 | End: 2023-09-29

## 2023-09-29 RX ORDER — ONDANSETRON 4 MG/1
4 TABLET, FILM COATED ORAL EVERY 8 HOURS PRN
Status: DISCONTINUED | OUTPATIENT
Start: 2023-09-29 | End: 2023-10-03

## 2023-09-29 RX ORDER — ROCURONIUM BROMIDE 10 MG/ML
INJECTION, SOLUTION INTRAVENOUS AS NEEDED
Status: DISCONTINUED | OUTPATIENT
Start: 2023-09-29 | End: 2023-09-29 | Stop reason: SURG

## 2023-09-29 RX ORDER — SODIUM CHLORIDE 9 MG/ML
INJECTION, SOLUTION INTRAVENOUS CONTINUOUS PRN
Status: DISCONTINUED | OUTPATIENT
Start: 2023-09-29 | End: 2023-09-29 | Stop reason: SURG

## 2023-09-29 RX ADMIN — SODIUM CHLORIDE: 9 INJECTION, SOLUTION INTRAVENOUS at 12:56:00

## 2023-09-29 RX ADMIN — EPHEDRINE SULFATE 5 MG: 50 INJECTION, SOLUTION INTRAVENOUS at 12:50:00

## 2023-09-29 RX ADMIN — HYDROMORPHONE HYDROCHLORIDE 0.25 MG: 1 INJECTION, SOLUTION INTRAMUSCULAR; INTRAVENOUS; SUBCUTANEOUS at 12:35:00

## 2023-09-29 RX ADMIN — ONDANSETRON 4 MG: 2 INJECTION INTRAMUSCULAR; INTRAVENOUS at 12:17:00

## 2023-09-29 RX ADMIN — DEXAMETHASONE SODIUM PHOSPHATE 8 MG: 4 MG/ML VIAL (ML) INJECTION at 12:17:00

## 2023-09-29 RX ADMIN — SODIUM CHLORIDE, SODIUM LACTATE, POTASSIUM CHLORIDE, CALCIUM CHLORIDE: 600; 310; 30; 20 INJECTION, SOLUTION INTRAVENOUS at 12:49:00

## 2023-09-29 RX ADMIN — CEFAZOLIN SODIUM/WATER 2 G: 2 G/20 ML SYRINGE (ML) INTRAVENOUS at 12:16:00

## 2023-09-29 RX ADMIN — ROCURONIUM BROMIDE 3 MG: 10 INJECTION, SOLUTION INTRAVENOUS at 12:15:00

## 2023-09-29 RX ADMIN — ROCURONIUM BROMIDE 10 MG: 10 INJECTION, SOLUTION INTRAVENOUS at 13:20:00

## 2023-09-29 RX ADMIN — SODIUM CHLORIDE: 9 INJECTION, SOLUTION INTRAVENOUS at 12:16:00

## 2023-09-29 RX ADMIN — ROCURONIUM BROMIDE 47 MG: 10 INJECTION, SOLUTION INTRAVENOUS at 12:17:00

## 2023-09-29 RX ADMIN — LIDOCAINE HYDROCHLORIDE 50 MG: 10 INJECTION, SOLUTION EPIDURAL; INFILTRATION; INTRACAUDAL; PERINEURAL at 12:15:00

## 2023-09-29 RX ADMIN — HYDROMORPHONE HYDROCHLORIDE 0.25 MG: 1 INJECTION, SOLUTION INTRAMUSCULAR; INTRAVENOUS; SUBCUTANEOUS at 13:07:00

## 2023-09-29 RX ADMIN — ROCURONIUM BROMIDE 5 MG: 10 INJECTION, SOLUTION INTRAVENOUS at 13:40:00

## 2023-09-29 RX ADMIN — EPHEDRINE SULFATE 5 MG: 50 INJECTION, SOLUTION INTRAVENOUS at 12:51:00

## 2023-09-29 RX ADMIN — SODIUM CHLORIDE, SODIUM LACTATE, POTASSIUM CHLORIDE, CALCIUM CHLORIDE: 600; 310; 30; 20 INJECTION, SOLUTION INTRAVENOUS at 13:45:00

## 2023-09-29 RX ADMIN — ROCURONIUM BROMIDE 10 MG: 10 INJECTION, SOLUTION INTRAVENOUS at 12:55:00

## 2023-09-29 RX ADMIN — PHENYLEPHRINE HCL 40 MCG: 10 MG/ML VIAL (ML) INJECTION at 12:53:00

## 2023-09-29 NOTE — ANESTHESIA PROCEDURE NOTES
Airway  Date/Time: 9/29/2023 12:15 PM  Urgency: Elective      General Information and Staff    Patient location during procedure: OR  Resident/CRNA: Stevie Hope CRNA  Performed: CRNA   Performed by: Stevie Hope CRNA  Authorized by: Angela Fajardo MD      Indications and Patient Condition  Indications for airway management: anesthesia  Spontaneous Ventilation: absent  Sedation level: deep  Preoxygenated: yes  Patient position: sniffing  MILS maintained throughout  Mask difficulty assessment: 1 - vent by mask  No planned trial extubation    Final Airway Details  Final airway type: endotracheal airway      Successful airway: ETT  Cuffed: yes   Successful intubation technique: direct laryngoscopy  Facilitating devices/methods: intubating stylet  Endotracheal tube insertion site: oral  Blade: Marlen  Blade size: #3  ETT size (mm): 7.0    Cormack-Lehane Classification: grade I - full view of glottis  Placement verified by: capnometry   Measured from: teeth  ETT to teeth (cm): 21  Number of attempts at approach: 1  Number of other approaches attempted: 0

## 2023-09-29 NOTE — OPERATIVE REPORT
[facility] OPERATING ROOM  Operative Note     Matt Sen Location: OR   CSN [id number] MRN [id number]   Admission Date 9/29/2023 Operation Date 9/29/2023   Attending Physician Adan Hope DO Operating Physician Matthew Barfield DO      Preoperative Diagnosis: Pelvic mass, cervical intra     Postoperative Diagnosis: Pelvic mass, cervical intra     Procedure Performed:   Exploratory laparotomy, total abdominal hysterectomy, bilateral salpingo-oophorectomy       Primary Surgeon: Matthew Barfield DO      Assistant: Mirlande Patel     Surgical Findings: Patient had a large 9 x 12 cm pedunculated fibroid coming off the uterine fundus as it was densely adhered to the omentum with extreme varicosities within the omentum to this mass. There was at least 100 cc of pelvic fluid/ascites . I did make a decision to proceed with a partial omentectomy and a myomectomy in order to remove this mass and sent it to pathology. This mass was partly cystic and partly solid in nature. On frozen section came back as a benign degenerating leiomyoma. In addition the patient had a fibroid uterus that measured at least 14 x 13 x 13 cm with multiple large uterine fibroids within it. Both ovaries appear to be within normal limits however in the preop holding area, I had a very long discussion with the patient, her significant other, her mother and son about the pros and cons of leaving her ovaries versus removing them. I did state that her risk of ovarian cancer is fairly low, under 2% but that there is no screening for ovarian cancer. Because the patient is 46years old, I felt that the risks of keeping the ovary are probably greater than the benefits in the long run and therefore performing oophorectomy although subjective and controversial, would be of potential benefit. Patient was very adamant that she wanted her ovaries removed in the preoperative holding area.   The frozen section of the uterus also came back as multiple leiomyomas all of which appear to be benign in nature. Anesthesia: General     Complications: None     Implants: * No implants in log *     Specimen: Uterus cervix tubes ovaries, pelvic washings, pedunculated uterine fibroid     Drains: None     Condition: Stable     Estimated Blood Loss: Blood Output: 250 mL (9/29/2023  1:28 PM)      Urine Output[de-identified] 400    Fluids: 3400     Summary of Case:  Patient was taken back to the operating room and placed in a lithotomy position. She then prepared and draped in the normal sterile fashion in dorsal lithotomy position. Next a Willis catheter was placed under sterile conditions. Next the ASIS was then identified bilaterally, marked, as well as the pubic symphysis. A Pfannenstiel incision was then made approximately 2 cm above her pubic symphysis. This incision was extended onto the fascia using the cautery. The fascial incision was then created and extended laterally. Next 2 Kelvin's were then placed on the fascia tented up and the rectus muscle was then dissected off of the fascia both sharply, bluntly as well as with monopolar cautery for any vascular pedicles. This was done anteriorly and posteriorly. Next the rectus muscle was then  in the midline, and the peritoneum was identified entered bluntly. The peritoneal incision was then extended superiorly and inferiorly with good visualization of the bladder. Next the Bookwalter was then used as a my self-retaining retractor. I began performing the pelvic washings as there was about 100 cc of ascites present. Next I identified a large omental vascular pedicle to the uterine fundus the was then clamped and triply ligated due to the extensive engorged varicosities in this aspect of the omentum. Next decision was made to proceed with a myomectomy and removal of his very large pedunculated fibroid as it was at very high risk for bleeding throughout the case.   I then placed a curved Ashley underneath lightly cauterized the base and so I sense the entire pedunculated fibroid to pathology and the frozen section came back as benign. Open up the peritoneum lateral to her IP ligament open up the pararectal space. The round ligament was then isolated suture-ligated and transected. The pararectal space was then opened up even further and the ureters were then identified and a defect was created in the medial leaf the broad ligament between the IP ligament and the ureter. The IP ligament was then doubly clamped transected and doubly ligated. Next the bladder flap was then created using monopolar cautery as well as sharp and blunt dissection and traction countertraction. I was successful in dissecting the vesicouterine space all the way down to the cervicovaginal junction. Even the vesicovaginal space had an extensive amount of varicosities. Nevertheless I then we identified the ureters and clamped the uterine vessels at the level of the internal cervical os using curved Zeppelin's transected and ligated. The cardinal ligaments were then grasped medial to the uterine vessels clamped transected and ligated all the way down to the cervicovaginal junction at which time I placed 2 curved Zeppelin's around the proximal vagina. The Natalie scissors were then used to create the colpotomy. The uterus cervix and both tubes and ovaries were sent to pathology en bloc. The vaginal cuff was then closed using an 0 Vicryl in a Mendez fashion as well as multiple figure-of-eight stitches. I did irrigate with over a liter of water and felt that hemostasis was assured. I did put Floseal in all the retroperitoneal spaces including the vesicovaginal, rectovaginal space. I also put snow on the bladder flap and observed for well over 5 minutes while awaiting the pathology report there was excellent hemostasis noted.   I then reexamined the omental pedicle ligated a third time and felt that hemostasis was finally achieved. The frozen section came back as benign I proceeded to place Seprafilm over the vaginal cuff and then pulled down whenever omentum was left down into her pelvis. I then put another sheet of Seprafilm over her omentum in a vertical fashion. The fascia was then closed using #1 looped PDS in a running fashion. The skin was then closed using a 3-0 Vicryl in a running fashion as well as a 4-0 PDS and Dermabond was used in closing the skin. Patient went to recovery in stable condition.    I was present scrubbed and are performed the entire procedure from skin the skin all the lap counts instruments and needles were correct x2            [address] [place], DO  9/29/2023  1:59 PM

## 2023-09-29 NOTE — H&P
DIAGNOSIS:  SILVINA I  Uterine fibroids  Urinary retention  CHIEF COMPLAINT:    Uterine fibroids, SILVINA 1    HISTORY OF PRESENT ILLNESS:    Elizabeth Cook is a very pleasant 50year old  female who presents for consultation secondary to enlarging uterine fibroids, urinary retention and SILVINA 1. Patient reports history of uterine fibroids first noted about 1 year ago during evaluation for UTI symptoms. Patient states that at that time, fibroids were noted to be about 3-5 cm. [facility] after being referred by PCP for uterine fibroids and ASCUS/HPV+ pap smear. Patient underwent CT abdomen/pelvis in 2023 which showed enlargement of the uterus with two dominant fibroids, one pedunculated extending into the right lower abdomen measuring up to 9.4 x 7.1 cm, and another centered along the posterior uterus measuring up to 10.5 x 9.6 cm. Follow-up MRI abdomen/pelvis showed multiple large uterine masses and mass effect on the urinary bladder. Patient underwent colposcopy with biopsies with Dr. Radha Cullen, [facility] consistent with SILVINA 1. Patient was referred to gyn onc for further management. She presents today for her initial visit. Patient states today she is overall doing well at this time, denies any pelvic or abdominal pain. Continues with urinary retention, needing to reposition body at time of urination to be able to release urine. Continues with regular menses, no spotting between cycles. She is sexually active without concerns. Does report constipation, this has been present for some time. Patient also reports that she does retain fluid as well. PAST CANCER HISTORY:         None    PAST MEDICAL HISTORY:  SILVINA I  Uterine fibroids  Urinary retention  PAST SURGICAL HISTORY:           PAST OB-GYN HISTORY:    Menses Details: Age of First Menses: 6;  Last Period: 2023; Cycle Length: 5;  Pregnancy Details: : 3; Para: 2;  Menopause Information: Premenopausal;  OB/GYN Medication Hx: IUD: No; Other Contraceptive Hx: No;    PAST SOCIAL HISTORY:    (Reviewed - no changes required)  Marital:   Smoking Status: Smoking Tobacco : Former smoker; Smokeless Tobacco : none found; Vaping : none found   Alcohol Consumption: None  Substance Abuse: None   Work History:        PAST FAMILY HISTORY:    Father: Alive and Well; Mother: Alive and Well    CURRENT MEDICATIONS:      No current medication    ALLERGIES:    MSG (monosodium glutamate)  Sulfites    REVIEW OF SYSTEMS:    A 14 point review of systems was performed with any pertinent positives noted in the HPI and otherwise negative. VITAL SIGNS:    Blood pressure: 116/60, Pulse: 78, Temperature: 97.7 F, Respirations: , O2 sat: 98%, Pain Scale: , Height: 66 in, Weight: 141.8 lb, BSA: 1.73, BMI: 22.89 kg/m2    PHYSICAL EXAM:    GENERAL: Alert and oriented x's 3. Well appearing female in NAD. HEENT: Pupils are equal and reactive without scleral icterus. Normal cephalic, atraumatic,   PERRLA. EOM's intact bilaterally. NECK: Trachea is midline. Supple, no LAD, no thyromegaly. BACK: No CVA or spinous tenderness. LUNGS: CTAB, no wheezing. Breathing non-labored  CARDIAC: RRR, no murmurs. ABDOMEN: Soft NTND, BS x's 4. No guarding or rebound tenderness. LYMPH: no inguinal or cervical LAD  :    External Genitalia: Normal external genitalia. Hair distribution, no lesion. Urethral Meatus: No lesions. Urethra: No masses no nodularity. Bladder: Fullness, no masses, no tenderness, no scarring. Vagina: Smooth vaginal mucosa. Cervix: Normal appearing cervix. small, smooth, difficult to visualize due to positioning. However, no gross concerns. Blood from os, consistent with menses    Uterus: Uterus is enlarged, but mobile. Uterine/pelvic mass with anterior prominence, on bimanual exam, able to note urethra, which is being compressed by mass.  Mass is approximately 20 x20 cm    Adnexa: Do not appreciate any pelvic masses or nodularity. Parametria: No nodularity  RECTAL: Deferred. EXTREMITIES: No clubbing, cyanosis, or edema bilaterally. NEUROLOGIC: Cranial nerves are grossly intact bilaterally. PSYCHIATRIC: Appropriate mood and affect. ECOG:        LABS/PATHOLOGY:    Pap smear 7/12/23: ASCUS/HPV+    8/18/23:  A. Endocervix, curettings:  - Fragments of ectocervical squamous epithelium with focal changes of low-grade squamous intraepithelial lesion (SILVINA-1). - Endocervical glandular epithelium not visualized. B.  Ectocervix, biopsy:  - Benign ectocervical squamous epithelium.  - Transformation zone not visualized. DIAGNOSTIC STUDIES REVIEWED:    CT AP 4/30/23:  CONCLUSION:    1. There is enlargement of the uterus with 2 dominant probable fibroids noted. One is pedunculated extending into the right lower abdomen measuring up to 9.4 x 7.1 cm, however conceivably other differential considerations would include other soft tissue   masses such as a GIST tumor. The 2nd suspected fibroid is centered along the posterior uterine body measuring up to 10.5 x 9.6 cm. Other smaller fibroids are likely present as well. This could be further assessed with pelvic MRI as clinically  directed. Clinical correlation and gynecologic consultation are also suggested. 2. Probable functional cyst in left ovary measuring up to 3.2 cm. 3. Bilateral nephrolithiasis. No obstructive uropathy. Please see above for further details. MRI Abdomen/Pelvis 5/23/23:  CONCLUSION:    1. Multiple uterine masses most consistent with fibroids with the largest being an exophytic fundal fibroid and a posterior body submucosal fibroid. 2. No evidence of acute abdominal process. 3. There is mass effect on the urinary bladder by the enlarged fibroid uterus that may be causing the urinary retention.   4. Nodular densities within the right lateral breast and left medial breast could be further characterized using a diagnostic mammogram and ultrasound. HEALTH MAINTENANCE:    Immunizations:      Screening: Pap Smear on 08/2023     ASSESSMENT AND PLAN:  SILVINA I  Uterine fibroids  Uterine fibroids: Patient with large uterine fibroids, with apparent increased growth since previous imaging. Reviewed with patient recommendation for surgical management. Due to size of mass, will recommend exlap DILCIA/bilateral salpingectomy. Discussed with patient recommendations for  exploratory laparotomy, total abdominal hysterectomy, bilateral salpingectomy with possible bilateral oophorectomy, omentectomy, pelvic and paraaortic lymphadenectomy, bowel resection, and colostomy, and any other indicated procedure. Reviewed risks and potential complications of surgery to include but not limited to: bleeding, infection, re-operation, bowel/bladder injury, DVT/PE, wound dehiscence, and colostomy. I reviewed the risks/benefits, and alternatives to the procedure. Pt verbally consented to plan and all questions were answered to their satisfaction. Informed patient that she may be placed on prophylactic anticoagulation post-operatively. Prior to surgery, patient will obtain medical clearance. Patient stated understanding. Will plan to proceed with surgery in next few weeks. SILVINA I: noted on ECC, pap initially ASCUS/HPV+. Discussed implications with patient, plan to follow up repeat pap post op. HPV: as noted above, discussed implications, patient stated good understanding. Urinary retention: reported by patient, due to pelvic mass. Recommend continuation of positioning of body to release urine, did review methods to apply vaginal pressure to help release bladder. Patient stated understanding.

## 2023-09-29 NOTE — PROGRESS NOTES
Pt received from PACU. A&Ox4, irritable. On 3L NC. PRN morphine given for pain. Pt states \"morphine does not help & I do not want to take more pain medication\". PRN zofran given for nausea. IV fluids. ABD incision with surgical dressing, CDI. Willis catheter intact & draining. Clear liquid diet, advance as tolerated. Plan to discontinue catheter in the morning, advance diet, & pain management. Safety measures in place, frequent rounding necessary & using the call like enforced for adequate patient care and pain management.

## 2023-09-30 ENCOUNTER — APPOINTMENT (OUTPATIENT)
Dept: CT IMAGING | Facility: HOSPITAL | Age: 48
DRG: 742 | End: 2023-09-30
Attending: Other
Payer: COMMERCIAL

## 2023-09-30 PROBLEM — G57.21: Status: ACTIVE | Noted: 2023-09-30

## 2023-09-30 LAB
ALBUMIN SERPL-MCNC: 3 G/DL (ref 3.4–5)
ALBUMIN/GLOB SERPL: 1 {RATIO} (ref 1–2)
ALP LIVER SERPL-CCNC: 49 U/L
ALT SERPL-CCNC: 21 U/L
ANION GAP SERPL CALC-SCNC: 6 MMOL/L (ref 0–18)
AST SERPL-CCNC: 11 U/L (ref 15–37)
BASOPHILS # BLD AUTO: 0.01 X10(3) UL (ref 0–0.2)
BASOPHILS # BLD AUTO: 0.01 X10(3) UL (ref 0–0.2)
BASOPHILS NFR BLD AUTO: 0.1 %
BASOPHILS NFR BLD AUTO: 0.1 %
BILIRUB SERPL-MCNC: 0.3 MG/DL (ref 0.1–2)
BUN BLD-MCNC: 7 MG/DL (ref 7–18)
BUN/CREAT SERPL: 14 (ref 10–20)
CALCIUM BLD-MCNC: 8.5 MG/DL (ref 8.5–10.1)
CHLORIDE SERPL-SCNC: 104 MMOL/L (ref 98–112)
CO2 SERPL-SCNC: 26 MMOL/L (ref 21–32)
CREAT BLD-MCNC: 0.5 MG/DL
DEPRECATED RDW RBC AUTO: 44.9 FL (ref 35.1–46.3)
DEPRECATED RDW RBC AUTO: 46.1 FL (ref 35.1–46.3)
EGFRCR SERPLBLD CKD-EPI 2021: 114 ML/MIN/1.73M2 (ref 60–?)
EOSINOPHIL # BLD AUTO: 0 X10(3) UL (ref 0–0.7)
EOSINOPHIL # BLD AUTO: 0.03 X10(3) UL (ref 0–0.7)
EOSINOPHIL NFR BLD AUTO: 0 %
EOSINOPHIL NFR BLD AUTO: 0.2 %
ERYTHROCYTE [DISTWIDTH] IN BLOOD BY AUTOMATED COUNT: 18.2 % (ref 11–15)
ERYTHROCYTE [DISTWIDTH] IN BLOOD BY AUTOMATED COUNT: 18.3 % (ref 11–15)
GLOBULIN PLAS-MCNC: 3.1 G/DL (ref 2.8–4.4)
GLUCOSE BLD-MCNC: 124 MG/DL (ref 70–99)
HCT VFR BLD AUTO: 25.4 %
HCT VFR BLD AUTO: 25.4 %
HGB BLD-MCNC: 7.3 G/DL
HGB BLD-MCNC: 7.3 G/DL
IMM GRANULOCYTES # BLD AUTO: 0.07 X10(3) UL (ref 0–1)
IMM GRANULOCYTES # BLD AUTO: 0.08 X10(3) UL (ref 0–1)
IMM GRANULOCYTES NFR BLD: 0.5 %
IMM GRANULOCYTES NFR BLD: 0.5 %
LYMPHOCYTES # BLD AUTO: 0.65 X10(3) UL (ref 1–4)
LYMPHOCYTES # BLD AUTO: 0.91 X10(3) UL (ref 1–4)
LYMPHOCYTES NFR BLD AUTO: 4.2 %
LYMPHOCYTES NFR BLD AUTO: 5.8 %
MCH RBC QN AUTO: 19.8 PG (ref 26–34)
MCH RBC QN AUTO: 20.2 PG (ref 26–34)
MCHC RBC AUTO-ENTMCNC: 28.7 G/DL (ref 31–37)
MCHC RBC AUTO-ENTMCNC: 28.7 G/DL (ref 31–37)
MCV RBC AUTO: 68.8 FL
MCV RBC AUTO: 70.2 FL
MONOCYTES # BLD AUTO: 0.97 X10(3) UL (ref 0.1–1)
MONOCYTES # BLD AUTO: 1.22 X10(3) UL (ref 0.1–1)
MONOCYTES NFR BLD AUTO: 6.3 %
MONOCYTES NFR BLD AUTO: 7.7 %
NEUTROPHILS # BLD AUTO: 13.53 X10 (3) UL (ref 1.5–7.7)
NEUTROPHILS # BLD AUTO: 13.53 X10(3) UL (ref 1.5–7.7)
NEUTROPHILS # BLD AUTO: 13.78 X10 (3) UL (ref 1.5–7.7)
NEUTROPHILS # BLD AUTO: 13.78 X10(3) UL (ref 1.5–7.7)
NEUTROPHILS NFR BLD AUTO: 85.9 %
NEUTROPHILS NFR BLD AUTO: 88.7 %
OSMOLALITY SERPL CALC.SUM OF ELEC: 281 MOSM/KG (ref 275–295)
PLATELET # BLD AUTO: 382 10(3)UL (ref 150–450)
PLATELET # BLD AUTO: 392 10(3)UL (ref 150–450)
POTASSIUM SERPL-SCNC: 4.5 MMOL/L (ref 3.5–5.1)
PROT SERPL-MCNC: 6.1 G/DL (ref 6.4–8.2)
RBC # BLD AUTO: 3.62 X10(6)UL
RBC # BLD AUTO: 3.69 X10(6)UL
SODIUM SERPL-SCNC: 136 MMOL/L (ref 136–145)
WBC # BLD AUTO: 15.5 X10(3) UL (ref 4–11)
WBC # BLD AUTO: 15.8 X10(3) UL (ref 4–11)

## 2023-09-30 PROCEDURE — 99223 1ST HOSP IP/OBS HIGH 75: CPT | Performed by: OTHER

## 2023-09-30 PROCEDURE — 99233 SBSQ HOSP IP/OBS HIGH 50: CPT | Performed by: HOSPITALIST

## 2023-09-30 PROCEDURE — 72193 CT PELVIS W/DYE: CPT | Performed by: OTHER

## 2023-09-30 RX ORDER — MORPHINE SULFATE 4 MG/ML
4 INJECTION, SOLUTION INTRAMUSCULAR; INTRAVENOUS EVERY 2 HOUR PRN
Status: DISCONTINUED | OUTPATIENT
Start: 2023-09-30 | End: 2023-09-30

## 2023-09-30 RX ORDER — ACETAMINOPHEN 325 MG/1
650 TABLET ORAL EVERY 6 HOURS PRN
Status: DISCONTINUED | OUTPATIENT
Start: 2023-09-30 | End: 2023-10-03

## 2023-09-30 RX ORDER — MORPHINE SULFATE 2 MG/ML
1 INJECTION, SOLUTION INTRAMUSCULAR; INTRAVENOUS EVERY 2 HOUR PRN
Status: DISCONTINUED | OUTPATIENT
Start: 2023-09-30 | End: 2023-09-30

## 2023-09-30 RX ORDER — ENEMA 19; 7 G/133ML; G/133ML
1 ENEMA RECTAL ONCE AS NEEDED
Status: DISCONTINUED | OUTPATIENT
Start: 2023-09-30 | End: 2023-10-03

## 2023-09-30 RX ORDER — POLYETHYLENE GLYCOL 3350 17 G/17G
17 POWDER, FOR SOLUTION ORAL DAILY PRN
Status: DISCONTINUED | OUTPATIENT
Start: 2023-09-30 | End: 2023-10-03

## 2023-09-30 RX ORDER — TRAMADOL HYDROCHLORIDE 50 MG/1
50 TABLET ORAL EVERY 6 HOURS PRN
Status: DISCONTINUED | OUTPATIENT
Start: 2023-09-30 | End: 2023-10-03

## 2023-09-30 RX ORDER — BISACODYL 10 MG
10 SUPPOSITORY, RECTAL RECTAL
Status: DISCONTINUED | OUTPATIENT
Start: 2023-09-30 | End: 2023-10-03

## 2023-09-30 RX ORDER — DOCUSATE SODIUM 100 MG/1
100 CAPSULE, LIQUID FILLED ORAL 2 TIMES DAILY
Status: DISCONTINUED | OUTPATIENT
Start: 2023-09-30 | End: 2023-10-03

## 2023-09-30 RX ORDER — MORPHINE SULFATE 2 MG/ML
2 INJECTION, SOLUTION INTRAMUSCULAR; INTRAVENOUS EVERY 2 HOUR PRN
Status: DISCONTINUED | OUTPATIENT
Start: 2023-09-30 | End: 2023-09-30

## 2023-09-30 RX ORDER — HYDROCODONE BITARTRATE AND ACETAMINOPHEN 5; 325 MG/1; MG/1
1 TABLET ORAL EVERY 6 HOURS PRN
Status: DISCONTINUED | OUTPATIENT
Start: 2023-09-30 | End: 2023-09-30

## 2023-09-30 NOTE — PLAN OF CARE
Discussed plan of care for day, including all given medications and their indications. Nausea experienced related to IV morphine -- Relieved with Zofran. Tolerating Norco or Tylenol as needed for pain -- Instructed to consume small amount of oral intake before pain meds given. Numbness and impaired sensation noted to right inner thigh -- Bilateral legs weak and patient is unable to bear weight on legs to walk. Transfers to rolling chair completed for toileting. Dr. Brandon Ambriz aware. Neurology consulted. CT of pelvis completed. Physical therapy ordered to see patient. Deep breathing exercises encouraged and increased activity as tolerated also encouraged. Comfort measures encouraged to promote restful environment. Problem: Patient Centered Care  Goal: Patient preferences are identified and integrated in the patient's plan of care  Description: Interventions:  - What would you like us to know as we care for you? I live at home with my significant other and son.   - Provide timely, complete, and accurate information to patient/family  - Incorporate patient and family knowledge, values, beliefs, and cultural backgrounds into the planning and delivery of care  - Encourage patient/family to participate in care and decision-making at the level they choose  - Honor patient and family perspectives and choices  Outcome: Progressing     Problem: Patient/Family Goals  Goal: Patient/Family Long Term Goal  Description: Patient's Long Term Goal: Discharge from hospital    Interventions:  - Manage pain after surgery  - Regain function and sensation to right leg  -  Be able to ambulate by self  - See additional Care Plan goals for specific interventions  Outcome: Progressing  Goal: Patient/Family Short Term Goal  Description: Patient's Short Term Goal: Manage pain after surgery    Interventions:   - Transition from IV to oral pain medications  - Take periods of rest after periods of activity  - Consume small frequent meals to prevent bloating  - See additional Care Plan goals for specific interventions  Outcome: Progressing     Problem: PAIN - ADULT  Goal: Verbalizes/displays adequate comfort level or patient's stated pain goal  Description: INTERVENTIONS:  - Encourage pt to monitor pain and request assistance  - Assess pain using appropriate pain scale  - Administer analgesics based on type and severity of pain and evaluate response  - Implement non-pharmacological measures as appropriate and evaluate response  - Consider cultural and social influences on pain and pain management  - Manage/alleviate anxiety  - Utilize distraction and/or relaxation techniques  - Monitor for opioid side effects  - Notify MD/LIP if interventions unsuccessful or patient reports new pain  - Anticipate increased pain with activity and pre-medicate as appropriate  Outcome: Progressing     Problem: RISK FOR INFECTION - ADULT  Goal: Absence of fever/infection during anticipated neutropenic period  Description: INTERVENTIONS  - Monitor WBC  - Administer growth factors as ordered  - Implement neutropenic guidelines  Outcome: Progressing     Problem: DISCHARGE PLANNING  Goal: Discharge to home or other facility with appropriate resources  Description: INTERVENTIONS:  - Identify barriers to discharge w/pt and caregiver  - Include patient/family/discharge partner in discharge planning  - Arrange for needed discharge resources and transportation as appropriate  - Identify discharge learning needs (meds, wound care, etc)  - Arrange for interpreters to assist at discharge as needed  - Consider post-discharge preferences of patient/family/discharge partner  - Complete POLST form as appropriate  - Assess patient's ability to be responsible for managing their own health  - Refer to Case Management Department for coordinating discharge planning if the patient needs post-hospital services based on physician/LIP order or complex needs related to functional status, cognitive ability or social support system  Outcome: Progressing     Problem: SAFETY ADULT - FALL  Goal: Free from fall injury  Description: INTERVENTIONS:  - Assess pt frequently for physical needs  - Identify cognitive and physical deficits and behaviors that affect risk of falls.   - [place] fall precautions as indicated by assessment.  - Educate pt/family on patient safety including physical limitations  - Instruct pt to call for assistance with activity based on assessment  - Modify environment to reduce risk of injury  - Provide assistive devices as appropriate  - Consider OT/PT consult to assist with strengthening/mobility  - Encourage toileting schedule  Outcome: Progressing     Problem: SKIN/TISSUE INTEGRITY - ADULT  Goal: Skin integrity remains intact  Description: INTERVENTIONS  - Assess and document risk factors for pressure ulcer development  - Assess and document skin integrity  - Monitor for areas of redness and/or skin breakdown  - Initiate interventions, skin care algorithm/standards of care as needed  Outcome: Progressing  Goal: Incision(s), wounds(s) or drain site(s) healing without S/S of infection  Description: INTERVENTIONS:  - Assess and document risk factors for pressure ulcer development  - Assess and document skin integrity  - Assess and document dressing/incision, wound bed, drain sites and surrounding tissue  - Implement wound care per orders  - Initiate isolation precautions as appropriate  - Initiate Pressure Ulcer prevention bundle as indicated  Outcome: Progressing  Goal: Oral mucous membranes remain intact  Description: INTERVENTIONS  - Assess oral mucosa and hygiene practices  - Implement preventative oral hygiene regimen  - Implement oral medicated treatments as ordered  Outcome: Progressing     Problem: NEUROLOGICAL - ADULT  Goal: Achieves stable or improved neurological status  Description: INTERVENTIONS  - Assess for and report changes in neurological status  - Initiate measures to prevent increased intracranial pressure  - Maintain blood pressure and fluid volume within ordered parameters to optimize cerebral perfusion and minimize risk of hemorrhage  - Monitor temperature, glucose, and sodium. Initiate appropriate interventions as ordered  Outcome: Progressing  Goal: Achieves maximal functionality and self care  Description: INTERVENTIONS  - Monitor swallowing and airway patency with patient fatigue and changes in neurological status  - Encourage and assist patient to increase activity and self care with guidance from PT/OT  - Encourage visually impaired, hearing impaired and aphasic patients to use assistive/communication devices  Outcome: Progressing     All efforts maintained to promote infection prevention during my assessment and care for this patient. Stethoscope cleansed and hand hygiene strictly maintained.

## 2023-09-30 NOTE — PLAN OF CARE
Patient is alert and orientated x4. At times irritable. 3L of 02. Given PRN morphine for pain, states it gives her nausea. ABD incisions with surgical dressing. Willis removed. Non pitting edema to the upper extremities . Safety measures in place. Call light with in reach.      Problem: PAIN - ADULT  Goal: Verbalizes/displays adequate comfort level or patient's stated pain goal  Description: INTERVENTIONS:  - Encourage pt to monitor pain and request assistance  - Assess pain using appropriate pain scale  - Administer analgesics based on type and severity of pain and evaluate response  - Implement non-pharmacological measures as appropriate and evaluate response  - Consider cultural and social influences on pain and pain management  - Manage/alleviate anxiety  - Utilize distraction and/or relaxation techniques  - Monitor for opioid side effects  - Notify MD/LIP if interventions unsuccessful or patient reports new pain  - Anticipate increased pain with activity and pre-medicate as appropriate  Outcome: Progressing     Problem: RISK FOR INFECTION - ADULT  Goal: Absence of fever/infection during anticipated neutropenic period  Description: INTERVENTIONS  - Monitor WBC  - Administer growth factors as ordered  - Implement neutropenic guidelines  Outcome: Progressing     Problem: DISCHARGE PLANNING  Goal: Discharge to home or other facility with appropriate resources  Description: INTERVENTIONS:  - Identify barriers to discharge w/pt and caregiver  - Include patient/family/discharge partner in discharge planning  - Arrange for needed discharge resources and transportation as appropriate  - Identify discharge learning needs (meds, wound care, etc)  - Arrange for interpreters to assist at discharge as needed  - Consider post-discharge preferences of patient/family/discharge partner  - Complete POLST form as appropriate  - Assess patient's ability to be responsible for managing their own health  - Refer to Case Management Department for coordinating discharge planning if the patient needs post-hospital services based on physician/LIP order or complex needs related to functional status, cognitive ability or social support system  Outcome: Progressing     Problem: SKIN/TISSUE INTEGRITY - ADULT  Goal: Skin integrity remains intact  Description: INTERVENTIONS  - Assess and document risk factors for pressure ulcer development  - Assess and document skin integrity  - Monitor for areas of redness and/or skin breakdown  - Initiate interventions, skin care algorithm/standards of care as needed  Outcome: Progressing  Goal: Incision(s), wounds(s) or drain site(s) healing without S/S of infection  Description: INTERVENTIONS:  - Assess and document risk factors for pressure ulcer development  - Assess and document skin integrity  - Assess and document dressing/incision, wound bed, drain sites and surrounding tissue  - Implement wound care per orders  - Initiate isolation precautions as appropriate  - Initiate Pressure Ulcer prevention bundle as indicated  Outcome: Progressing  Goal: Oral mucous membranes remain intact  Description: INTERVENTIONS  - Assess oral mucosa and hygiene practices  - Implement preventative oral hygiene regimen  - Implement oral medicated treatments as ordered  Outcome: Progressing

## 2023-10-01 LAB
ANION GAP SERPL CALC-SCNC: 4 MMOL/L (ref 0–18)
BASOPHILS # BLD AUTO: 0.01 X10(3) UL (ref 0–0.2)
BASOPHILS NFR BLD AUTO: 0.1 %
BUN BLD-MCNC: 6 MG/DL (ref 7–18)
BUN/CREAT SERPL: 12 (ref 10–20)
CALCIUM BLD-MCNC: 9.1 MG/DL (ref 8.5–10.1)
CHLORIDE SERPL-SCNC: 109 MMOL/L (ref 98–112)
CO2 SERPL-SCNC: 28 MMOL/L (ref 21–32)
CREAT BLD-MCNC: 0.5 MG/DL
DEPRECATED RDW RBC AUTO: 45.1 FL (ref 35.1–46.3)
EGFRCR SERPLBLD CKD-EPI 2021: 114 ML/MIN/1.73M2 (ref 60–?)
EOSINOPHIL # BLD AUTO: 0 X10(3) UL (ref 0–0.7)
EOSINOPHIL NFR BLD AUTO: 0 %
ERYTHROCYTE [DISTWIDTH] IN BLOOD BY AUTOMATED COUNT: 18.4 % (ref 11–15)
GLUCOSE BLD-MCNC: 115 MG/DL (ref 70–99)
HCT VFR BLD AUTO: 24.9 %
HGB BLD-MCNC: 7.3 G/DL
IMM GRANULOCYTES # BLD AUTO: 0.06 X10(3) UL (ref 0–1)
IMM GRANULOCYTES NFR BLD: 0.4 %
LYMPHOCYTES # BLD AUTO: 0.86 X10(3) UL (ref 1–4)
LYMPHOCYTES NFR BLD AUTO: 6.2 %
MCH RBC QN AUTO: 20 PG (ref 26–34)
MCHC RBC AUTO-ENTMCNC: 29.3 G/DL (ref 31–37)
MCV RBC AUTO: 68.2 FL
MONOCYTES # BLD AUTO: 0.83 X10(3) UL (ref 0.1–1)
MONOCYTES NFR BLD AUTO: 6 %
NEUTROPHILS # BLD AUTO: 12.11 X10 (3) UL (ref 1.5–7.7)
NEUTROPHILS # BLD AUTO: 12.11 X10(3) UL (ref 1.5–7.7)
NEUTROPHILS NFR BLD AUTO: 87.3 %
OSMOLALITY SERPL CALC.SUM OF ELEC: 291 MOSM/KG (ref 275–295)
PLATELET # BLD AUTO: 395 10(3)UL (ref 150–450)
POTASSIUM SERPL-SCNC: 3.8 MMOL/L (ref 3.5–5.1)
RBC # BLD AUTO: 3.65 X10(6)UL
SODIUM SERPL-SCNC: 141 MMOL/L (ref 136–145)
WBC # BLD AUTO: 13.9 X10(3) UL (ref 4–11)

## 2023-10-01 PROCEDURE — 99233 SBSQ HOSP IP/OBS HIGH 50: CPT | Performed by: HOSPITALIST

## 2023-10-01 PROCEDURE — 99231 SBSQ HOSP IP/OBS SF/LOW 25: CPT | Performed by: OTHER

## 2023-10-01 RX ORDER — SIMETHICONE 80 MG
80 TABLET,CHEWABLE ORAL 2 TIMES DAILY PRN
Status: DISCONTINUED | OUTPATIENT
Start: 2023-10-01 | End: 2023-10-03

## 2023-10-01 NOTE — PLAN OF CARE
Discussed plan of care for day, including all given medications and their indications. Surgical pain managed with intermittent Tramadol and Tylenol alternating. Increased gas pain noted -- Simethicone given with improvement of pain but still awaiting actual passage of flatus. Numbness still present to right inner thigh -- patient able to compensate well to prevent buckling of leg. Seen by physical therapy this afternoon -- awaiting surgeon's response as to appropriateness of hip flexor brace. With assistance via rolling chair for bathroom needs. Neurology continuing to follow and assess. Comfort measures encouraged to promote restful environment. Problem: Patient Centered Care  Goal: Patient preferences are identified and integrated in the patient's plan of care  Description: Interventions:  - What would you like us to know as we care for you? I live at home with my significant other and son.   - Provide timely, complete, and accurate information to patient/family  - Incorporate patient and family knowledge, values, beliefs, and cultural backgrounds into the planning and delivery of care  - Encourage patient/family to participate in care and decision-making at the level they choose  - Honor patient and family perspectives and choices  Outcome: Progressing     Problem: Patient/Family Goals  Goal: Patient/Family Long Term Goal  Description: Patient's Long Term Goal: Discharge from hospital    Interventions:  - Manage pain after surgery  - Regain function and sensation to right leg  -  Be able to ambulate by self  - See additional Care Plan goals for specific interventions  Outcome: Progressing  Goal: Patient/Family Short Term Goal  Description: Patient's Short Term Goal: Manage pain after surgery    Interventions:   - Transition from IV to oral pain medications  - Take periods of rest after periods of activity  - Consume small frequent meals to prevent bloating  - See additional Care Plan goals for specific interventions  Outcome: Progressing     Problem: PAIN - ADULT  Goal: Verbalizes/displays adequate comfort level or patient's stated pain goal  Description: INTERVENTIONS:  - Encourage pt to monitor pain and request assistance  - Assess pain using appropriate pain scale  - Administer analgesics based on type and severity of pain and evaluate response  - Implement non-pharmacological measures as appropriate and evaluate response  - Consider cultural and social influences on pain and pain management  - Manage/alleviate anxiety  - Utilize distraction and/or relaxation techniques  - Monitor for opioid side effects  - Notify MD/LIP if interventions unsuccessful or patient reports new pain  - Anticipate increased pain with activity and pre-medicate as appropriate  Outcome: Progressing     Problem: RISK FOR INFECTION - ADULT  Goal: Absence of fever/infection during anticipated neutropenic period  Description: INTERVENTIONS  - Monitor WBC  - Administer growth factors as ordered  - Implement neutropenic guidelines  Outcome: Progressing     Problem: DISCHARGE PLANNING  Goal: Discharge to home or other facility with appropriate resources  Description: INTERVENTIONS:  - Identify barriers to discharge w/pt and caregiver  - Include patient/family/discharge partner in discharge planning  - Arrange for needed discharge resources and transportation as appropriate  - Identify discharge learning needs (meds, wound care, etc)  - Arrange for interpreters to assist at discharge as needed  - Consider post-discharge preferences of patient/family/discharge partner  - Complete POLST form as appropriate  - Assess patient's ability to be responsible for managing their own health  - Refer to Case Management Department for coordinating discharge planning if the patient needs post-hospital services based on physician/LIP order or complex needs related to functional status, cognitive ability or social support system  Outcome: Progressing Problem: SAFETY ADULT - FALL  Goal: Free from fall injury  Description: INTERVENTIONS:  - Assess pt frequently for physical needs  - Identify cognitive and physical deficits and behaviors that affect risk of falls.   - [place] fall precautions as indicated by assessment.  - Educate pt/family on patient safety including physical limitations  - Instruct pt to call for assistance with activity based on assessment  - Modify environment to reduce risk of injury  - Provide assistive devices as appropriate  - Consider OT/PT consult to assist with strengthening/mobility  - Encourage toileting schedule  Outcome: Progressing     Problem: SKIN/TISSUE INTEGRITY - ADULT  Goal: Skin integrity remains intact  Description: INTERVENTIONS  - Assess and document risk factors for pressure ulcer development  - Assess and document skin integrity  - Monitor for areas of redness and/or skin breakdown  - Initiate interventions, skin care algorithm/standards of care as needed  Outcome: Progressing  Goal: Incision(s), wounds(s) or drain site(s) healing without S/S of infection  Description: INTERVENTIONS:  - Assess and document risk factors for pressure ulcer development  - Assess and document skin integrity  - Assess and document dressing/incision, wound bed, drain sites and surrounding tissue  - Implement wound care per orders  - Initiate isolation precautions as appropriate  - Initiate Pressure Ulcer prevention bundle as indicated  Outcome: Progressing  Goal: Oral mucous membranes remain intact  Description: INTERVENTIONS  - Assess oral mucosa and hygiene practices  - Implement preventative oral hygiene regimen  - Implement oral medicated treatments as ordered  Outcome: Progressing     Problem: NEUROLOGICAL - ADULT  Goal: Achieves stable or improved neurological status  Description: INTERVENTIONS  - Assess for and report changes in neurological status  - Initiate measures to prevent increased intracranial pressure  - Maintain blood pressure and fluid volume within ordered parameters to optimize cerebral perfusion and minimize risk of hemorrhage  - Monitor temperature, glucose, and sodium. Initiate appropriate interventions as ordered  Outcome: Progressing  Goal: Achieves maximal functionality and self care  Description: INTERVENTIONS  - Monitor swallowing and airway patency with patient fatigue and changes in neurological status  - Encourage and assist patient to increase activity and self care with guidance from PT/OT  - Encourage visually impaired, hearing impaired and aphasic patients to use assistive/communication devices  Outcome: Progressing     All efforts maintained to promote infection prevention during my assessment and care for this patient. Stethoscope cleansed and hand hygiene strictly maintained.

## 2023-10-01 NOTE — PLAN OF CARE
Assumed care of patient at 0330. Rolling commode used for toileting, patient uses walker and gait belt to transfer to rolling commode. No episodes of leg buckling noted - patient reports that she doesn't feel there is any improvement in her leg, but that she is learning how to better manage it.

## 2023-10-01 NOTE — PHYSICAL THERAPY NOTE
PHYSICAL THERAPY EVALUATION - INPATIENT     Room Number: 442/442-A  Evaluation Date: 10/1/2023  Type of Evaluation: Initial   Physician Order: PT Eval and Treat    Presenting Problem: Uterine leiomyoma:  Patient with a history of uterine mass, underwent hysterectomy on 9/29. Immediately after recovery she started noticing right leg weakness. Numbness is over the right thigh. Reason for Therapy: Mobility Dysfunction and Discharge Planning    PHYSICAL THERAPY ASSESSMENT     Patient is a 46year old female admitted 9/29/2023 for Uterine leiomyoma:Patient with a history of uterine mass, underwent hysterectomy. Immediately after recovery she started noticing right leg weakness. Numbness is over the right thigh. Per neuro consult: \"Femoral nerve injury. Possible due to compression/stretch. It is difficult to predict the amount of damage done, it can range from neuropraxia to axonotmesis. Therefore the recovery time usually from 1 or 2 weeks up to 10 weeks. CT of the pelvis was done and did not show any evidence of hematoma. Otherwise patient might benefit from EMG done in 1 month\"   Patient's current functional deficits include impaired tolerance toward activity due to post operative pain and nausea, impaired bed mobility, transfers, ambulation, RLE hip flexor and knee extensor strength, impaired sensation R thigh, impaired stair negotiation, which are below the patient's pre-admission status. Patient received sitting in bedside chair with significant other present. Requesting to return to supine 2* nausea. Patient educated on POC and physiological benefits of mobilization. Primary complaint is RLE weakness and abdominal pain which she rates at 7 out of 10 per pain scale. R hip flexor strength and R knee extension strength:  2 out of 5. Transfers: SBA with use of RW. Cues for appropriate UE positioning with transitional movements. Instruction on functional breathing technique.    Ambulation: CG for straight path, Min for turns. 15ft. Instruction on step-to gait pattern. Patient compensating for RLE weakness by increased weight bearing through Ue's on RW. When pt did rely on RLE in weight bearing position x 2 episodes of R knee buckling requiring therapist to block R knee. Distance ambulated limited by nausea. Bed mobility: Sit>supine: CG instructed on log roll technique. Patient in bed at end of session with needs in reach. The patient's Approx Degree of Impairment: 41.77% has been calculated based on documentation in the [facility] '6 clicks' Inpatient Basic Mobility Short Form. Research supports that patients with this level of impairment may benefit from home with family support and outpatient PT to address post operative R LE strength deficits. Equipment recommendation for DC: Rolling walker. ? Hip spica brace (pending surgeons approval)*  *Therapist reached out to surgeon to see if Hip spica brace would be considered to address RLE weakness- concern for brace to be rubbing against abdominal incision. Patient will benefit from continued IP PT services to address these deficits in preparation for discharge. DISCHARGE RECOMMENDATIONS  PT Discharge Recommendations: Home; Intermittent Supervision; Outpatient PT    PLAN  PT Treatment Plan: Body mechanics; Bed mobility; Endurance; Energy conservation;Patient education; Family education;Gait training;Range of motion;Strengthening;Stoop training;Neuromuscular re-educate;Stair training;Transfer training;Balance training  Rehab Potential : Good  Frequency (Obs): 5x/week       PHYSICAL THERAPY MEDICAL/SOCIAL HISTORY     Problem List  Principal Problem:    Uterine leiomyoma  Active Problems:    Femoral nerve palsy, right      HOME SITUATION  Home Situation  Type of Home: House  Home Layout: One level  Stairs to Enter : 2  Lives With: Significant other  Drives: Yes  Patient Owned Equipment: None  Patient Regularly Uses: None     Prior Level of [place]: Ind in ADL's and IADL's +working FT as a drive     SUBJECTIVE  \"I'm just really worried about falling\"     PHYSICAL THERAPY EXAMINATION     OBJECTIVE  Precautions: None  Fall Risk: Standard fall risk      PAIN ASSESSMENT  Ratin  Location: abdomen  Management Techniques: Activity promotion; Body mechanics;Repositioning    COGNITION  Overall Cognitive Status:  WFL - within functional limits    RANGE OF MOTION AND STRENGTH ASSESSMENT  Upper extremity ROM and strength are within functional limits  Lower extremity PROM is within functional limits   Lower extremity strength is within functional limits except for the following:    Right Hip flexion  2/5  Right Knee extension  2/5  Right Knee flexion  4/5  Right Dorsiflexion  5/5    BALANCE  Static Sitting: Normal  Dynamic Sitting: Normal  Static Standing: Fair  Dynamic Standing: Fair -    NEUROLOGICAL FINDINGS           Sensation: reports numbness on R thigh          ACTIVITY TOLERANCE           BP: 124/71  BP Location: Left arm  BP Method: Automatic  Patient Position: Lying    AM-PAC '6-Clicks' INPATIENT SHORT FORM - BASIC MOBILITY  How much difficulty does the patient currently have. .. Patient Difficulty: Turning over in bed (including adjusting bedclothes, sheets and blankets)?: None   Patient Difficulty: Sitting down on and standing up from a chair with arms (e.g., wheelchair, bedside commode, etc.): A Little   Patient Difficulty: Moving from lying on back to sitting on the side of the bed?: A Little   How much help from another person does the patient currently need. ..    Help from Another: Moving to and from a bed to a chair (including a wheelchair)?: A Little   Help from Another: Need to walk in hospital room?: A Little   Help from Another: Climbing 3-5 steps with a railing?: A Little     AM-PAC Score:  Raw Score: 19   Approx Degree of Impairment: 41.77%   Standardized Score (AM-PAC Scale): 45.44   CMS Modifier (G-Code): CK    FUNCTIONAL ABILITY STATUS  Functional Mobility/Gait Assessment  Gait Assistance: Contact guard assist;Minimum assistance  Distance (ft): 15ft  Assistive Device: Rolling walker  Pattern: R Decreased stance time (intermittent R knee buckling)    Exercise/Education Provided:  Bed mobility  Body mechanics  Energy conservation  Functional activity tolerated  Gait training  Posture  ROM  Strengthening  Lower therapeutic exercise: Ankle pumps  Heel slides  Transfer training    Patient End of Session: Needs met; In bed;Call light within reach;RN aware of session/findings; Family present    CURRENT GOALS    Goals to be met by: 10/15/23  Patient Goal Patient's self-stated goal is: return to PLOF   Goal #1 Patient is able to demonstrate supine - sit EOB @ level: independent     Goal #1   Current Status    Goal #2 Patient is able to demonstrate transfers Sit to/from Stand at assistance level: modified independent with  LRAD     Goal #2  Current Status    Goal #3 Patient is able to ambulate 300 feet with assist device:  LRAD  at assistance level: supervision   Goal #3   Current Status    Goal #4 Patient will negotiate 2 stairs/one curb w/ assistive device and supervision   Goal #4   Current Status    Goal #5 Patient to demonstrate independence with home activity/exercise instructions provided to patient in preparation for discharge.    Goal #5   Current Status      Patient Evaluation Complexity Level:  History Low - no personal factors and/or co-morbidities   Examination of body systems Low - addressing 1-2 elements   Clinical Presentation Low - Stable   Clinical Decision Making Low Complexity       Gait Trainin minutes  Therapeutic Activity: 10 minutes

## 2023-10-01 NOTE — PLAN OF CARE
Problem: Patient Centered Care  Goal: Patient preferences are identified and integrated in the patient's plan of care  Description: Interventions:  - What would you like us to know as we care for you? I live at home with my significant other and son. - Provide timely, complete, and accurate information to patient/family  - Incorporate patient and family knowledge, values, beliefs, and cultural backgrounds into the planning and delivery of care  - Encourage patient/family to participate in care and decision-making at the level they choose  - Honor patient and family perspectives and choices  Outcome: Progressing     Problem: Patient Centered Care  Goal: Patient preferences are identified and integrated in the patient's plan of care  Description: Interventions:  - What would you like us to know as we care for you? I live at home with my significant other and son. - Provide timely, complete, and accurate information to patient/family  - Incorporate patient and family knowledge, values, beliefs, and cultural backgrounds into the planning and delivery of care  - Encourage patient/family to participate in care and decision-making at the level they choose  - Honor patient and family perspectives and choices  Outcome: Progressing     Problem: NEUROLOGICAL - ADULT  Goal: Achieves stable or improved neurological status  Description: INTERVENTIONS  - Assess for and report changes in neurological status  - Initiate measures to prevent increased intracranial pressure  - Maintain blood pressure and fluid volume within ordered parameters to optimize cerebral perfusion and minimize risk of hemorrhage  - Monitor temperature, glucose, and sodium.  Initiate appropriate interventions as ordered  10/1/2023 0120 by Sherry Carrera RN  Outcome: Progressing  10/1/2023 0119 by Sherry Carrera RN  Outcome: Progressing  Goal: Achieves maximal functionality and self care  Description: INTERVENTIONS  - Monitor swallowing and airway patency with patient fatigue and changes in neurological status  - Encourage and assist patient to increase activity and self care with guidance from PT/OT  - Encourage visually impaired, hearing impaired and aphasic patients to use assistive/communication devices  10/1/2023 0120 by Rex Lesch, RN  Outcome: Progressing  10/1/2023 0119 by Rex Lesch, RN  Outcome: Progressing     Problem: SKIN/TISSUE INTEGRITY - ADULT  Goal: Skin integrity remains intact  Description: INTERVENTIONS  - Assess and document risk factors for pressure ulcer development  - Assess and document skin integrity  - Monitor for areas of redness and/or skin breakdown  - Initiate interventions, skin care algorithm/standards of care as needed  10/1/2023 0120 by Rex Lesch, RN  Outcome: Progressing  10/1/2023 0119 by Rex Lesch, RN  Outcome: Progressing  Goal: Incision(s), wounds(s) or drain site(s) healing without S/S of infection  Description: INTERVENTIONS:  - Assess and document risk factors for pressure ulcer development  - Assess and document skin integrity  - Assess and document dressing/incision, wound bed, drain sites and surrounding tissue  - Implement wound care per orders  - Initiate isolation precautions as appropriate  - Initiate Pressure Ulcer prevention bundle as indicated  10/1/2023 0120 by Rex Lesch, RN  Outcome: Progressing  10/1/2023 0119 by Rex Lesch, RN  Outcome: Progressing  Goal: Oral mucous membranes remain intact  Description: INTERVENTIONS  - Assess oral mucosa and hygiene practices  - Implement preventative oral hygiene regimen  - Implement oral medicated treatments as ordered  10/1/2023 0120 by Rex Lesch, RN  Outcome: Progressing  10/1/2023 0119 by Rex Lesch, RN  Outcome: Progressing     Problem: SAFETY ADULT - FALL  Goal: Free from fall injury  Description: INTERVENTIONS:  - Assess pt frequently for physical needs  - Identify cognitive and physical deficits and behaviors that affect risk of falls.  - [place] fall precautions as indicated by assessment.  - Educate pt/family on patient safety including physical limitations  - Instruct pt to call for assistance with activity based on assessment  - Modify environment to reduce risk of injury  - Provide assistive devices as appropriate  - Consider OT/PT consult to assist with strengthening/mobility  - Encourage toileting schedule  10/1/2023 0120 by Madelaine Schultz RN  Outcome: Progressing  10/1/2023 0119 by Madelaine Schultz RN  Outcome: Progressing     Problem: DISCHARGE PLANNING  Goal: Discharge to home or other facility with appropriate resources  Description: INTERVENTIONS:  - Identify barriers to discharge w/pt and caregiver  - Include patient/family/discharge partner in discharge planning  - Arrange for needed discharge resources and transportation as appropriate  - Identify discharge learning needs (meds, wound care, etc)  - Arrange for interpreters to assist at discharge as needed  - Consider post-discharge preferences of patient/family/discharge partner  - Complete POLST form as appropriate  - Assess patient's ability to be responsible for managing their own health  - Refer to Case Management Department for coordinating discharge planning if the patient needs post-hospital services based on physician/LIP order or complex needs related to functional status, cognitive ability or social support system  10/1/2023 0120 by Madelaine Schultz RN  Outcome: Progressing  10/1/2023 0119 by Madelaine Schultz RN  Outcome: Progressing     Problem: RISK FOR INFECTION - ADULT  Goal: Absence of fever/infection during anticipated neutropenic period  Description: INTERVENTIONS  - Monitor WBC  - Administer growth factors as ordered  - Implement neutropenic guidelines  10/1/2023 0120 by Madelaine Schultz RN  Outcome: Progressing  10/1/2023 0119 by Madelaine Schultz RN  Outcome: Progressing     Problem: PAIN - ADULT  Goal: Verbalizes/displays adequate comfort level or patient's stated pain goal  Description: INTERVENTIONS:  - Encourage pt to monitor pain and request assistance  - Assess pain using appropriate pain scale  - Administer analgesics based on type and severity of pain and evaluate response  - Implement non-pharmacological measures as appropriate and evaluate response  - Consider cultural and social influences on pain and pain management  - Manage/alleviate anxiety  - Utilize distraction and/or relaxation techniques  - Monitor for opioid side effects  - Notify MD/LIP if interventions unsuccessful or patient reports new pain  - Anticipate increased pain with activity and pre-medicate as appropriate  10/1/2023 0120 by Jackie Valenzuela RN  Outcome: Progressing  10/1/2023 0119 by Jackie Valenzuela RN  Outcome: Progressing     Problem: Patient/Family Goals  Goal: Patient/Family Long Term Goal  Description: Patient's Long Term Goal: Discharge from hospital    Interventions:  - Manage pain after surgery  - Regain function and sensation to right leg  -  Be able to ambulate by self  - See additional Care Plan goals for specific interventions  10/1/2023 0120 by Jackie Valenzuela RN  Outcome: Progressing  10/1/2023 0119 by Jackie Valenzuela RN  Outcome: Progressing  Goal: Patient/Family Short Term Goal  Description: Patient's Short Term Goal: Manage pain after surgery    Interventions:   - Transition from IV to oral pain medications  - Take periods of rest after periods of activity  - Consume small frequent meals to prevent bloating  - See additional Care Plan goals for specific interventions  10/1/2023 0120 by Jackie Valenzuela RN  Outcome: Progressing  10/1/2023 0119 by Jackie Valenzuela RN  Outcome: Progressing     Problem: Patient Centered Care  Goal: Patient preferences are identified and integrated in the patient's plan of care  Description: Interventions:  - What would you like us to know as we care for you? I live at home with my significant other and son.   - Provide timely, complete, and accurate information to patient/family  - Incorporate patient and family knowledge, values, beliefs, and cultural backgrounds into the planning and delivery of care  - Encourage patient/family to participate in care and decision-making at the level they choose  - Honor patient and family perspectives and choices  10/1/2023 0120 by Nas Walker RN  Outcome: Progressing  10/1/2023 0119 by Nas Walker RN  Outcome: Progressing      Pt is experiencing bilateral lower extremity weakness with numbness/impaired sensation on the right inner thigh. Unable to bear weight on legs to walk, sit to stand with walker and transfers using the rolling chair to use the bathroom. Vitals to be done Q4. PT on consult. Safety measures are in place.  Call light is within reach

## 2023-10-02 LAB
BASOPHILS # BLD AUTO: 0.02 X10(3) UL (ref 0–0.2)
BASOPHILS NFR BLD AUTO: 0.2 %
DEPRECATED RDW RBC AUTO: 46.2 FL (ref 35.1–46.3)
EOSINOPHIL # BLD AUTO: 0.05 X10(3) UL (ref 0–0.7)
EOSINOPHIL NFR BLD AUTO: 0.4 %
ERYTHROCYTE [DISTWIDTH] IN BLOOD BY AUTOMATED COUNT: 18.4 % (ref 11–15)
HCT VFR BLD AUTO: 30.7 %
HGB BLD-MCNC: 8.6 G/DL
IMM GRANULOCYTES # BLD AUTO: 0.06 X10(3) UL (ref 0–1)
IMM GRANULOCYTES NFR BLD: 0.5 %
LYMPHOCYTES # BLD AUTO: 0.97 X10(3) UL (ref 1–4)
LYMPHOCYTES NFR BLD AUTO: 8.4 %
MCH RBC QN AUTO: 19.8 PG (ref 26–34)
MCHC RBC AUTO-ENTMCNC: 28 G/DL (ref 31–37)
MCV RBC AUTO: 70.6 FL
MONOCYTES # BLD AUTO: 0.63 X10(3) UL (ref 0.1–1)
MONOCYTES NFR BLD AUTO: 5.4 %
NEUTROPHILS # BLD AUTO: 9.88 X10 (3) UL (ref 1.5–7.7)
NEUTROPHILS # BLD AUTO: 9.88 X10(3) UL (ref 1.5–7.7)
NEUTROPHILS NFR BLD AUTO: 85.1 %
PLATELET # BLD AUTO: 528 10(3)UL (ref 150–450)
RBC # BLD AUTO: 4.35 X10(6)UL
WBC # BLD AUTO: 11.6 X10(3) UL (ref 4–11)

## 2023-10-02 PROCEDURE — 99232 SBSQ HOSP IP/OBS MODERATE 35: CPT | Performed by: HOSPITALIST

## 2023-10-02 NOTE — PHYSICAL THERAPY NOTE
PHYSICAL THERAPY TREATMENT NOTE - INPATIENT     Room Number: 442/442-A       Presenting Problem: Uterine leiomyoma:  Patient with a history of uterine mass, underwent hysterectomy on 9/29. Immediately after recovery she started noticing right leg weakness. Numbness is over the right thigh. Problem List  Principal Problem:    Uterine leiomyoma  Active Problems:    Femoral nerve palsy, right      PHYSICAL THERAPY ASSESSMENT     Patient received supine in bed. RN approved activity. Therapist educated pt on POC and benefits of mobilization. Patient with improvement in RLE strength this date. R hip flexion and R knee extension: 2+/5. Continues to present with numbness in R thigh- therapist educated pt on desensitization techniques with utilization of various textures on skin (ie: brush, face towel, lotion). Patient agreeable to participate. Reports improved confidence when bearing weight through RLE. Primary complaint is post operative abdominal pain which she rates at 5 out of 10 per pain scale. *Although Hip Spica brace ordered (this therapists recommendation based on findings during eval on 10/1)- due to improved mobility of RLE a hip brace is no longer warranted. A R knee immobilizer may be beneficial (ordered through central supply) to decrease risk of R knee buckling during ambulation   Bed mobility: Independent. Instruction on log roll technique. Transfers: CG with use of RW. Cues for appropriate UE positioning with transitional movements. Instruction to slide RLE forward prior to transfer. SBA<>CG to maintain static standing with bilateral UE support on RW. Ambulation: CG with use of RW. 10ft. 100ft. Decreased alejandra speed. Step-to gait pattern. No events of RLE buckling. Patient compensating for RLE by increasing weight bearing through Ue's on RW. Patient supine in bed at end of session with needs in reach.      The patient's Approx Degree of Impairment: 35.83% has been calculated based on documentation in the [facility] '6 clicks' Inpatient Basic Mobility Short Form. Research supports that patients with this level of impairment may benefit from home with family support and outpatient PT.    DISCHARGE RECOMMENDATIONS  PT Discharge Recommendations: Home; Intermittent Supervision; Outpatient PT     PLAN  PT Treatment Plan: Body mechanics; Bed mobility; Endurance; Energy conservation;Patient education; Family education;Gait training;Range of motion;Strengthening;Stoop training;Neuromuscular re-educate;Stair training;Transfer training;Balance training  Frequency (Obs): 5x/week    SUBJECTIVE  \"I'm going to stay with my mom when I get home and she'll take me to therapy\"     OBJECTIVE  Precautions: None        PAIN ASSESSMENT   Ratin  Location: abdomen  Management Techniques: Activity promotion; Body mechanics;Repositioning    BALANCE  Static Sitting: Normal  Dynamic Sitting: Normal  Static Standing: Good  Dynamic Standing: Fair    ACTIVITY TOLERANCE  Pulse: 85  Heart Rate Source: Monitor         O2 WALK  Oxygen Therapy  SPO2% Ambulation on Room Air: 770    AM-PAC '6-Clicks' INPATIENT SHORT FORM - BASIC MOBILITY  How much difficulty does the patient currently have. .. Patient Difficulty: Turning over in bed (including adjusting bedclothes, sheets and blankets)?: None   Patient Difficulty: Sitting down on and standing up from a chair with arms (e.g., wheelchair, bedside commode, etc.): A Little   Patient Difficulty: Moving from lying on back to sitting on the side of the bed?: None   How much help from another person does the patient currently need. ..    Help from Another: Moving to and from a bed to a chair (including a wheelchair)?: A Little   Help from Another: Need to walk in hospital room?: A Little   Help from Another: Climbing 3-5 steps with a railing?: A Little     AM-PAC Score:  Raw Score: 20   Approx Degree of Impairment: 35.83%   Standardized Score (AM-PAC Scale): 47.67   CMS Modifier (G-Code): CJ    FUNCTIONAL ABILITY STATUS  Functional Mobility/Gait Assessment  Gait Assistance: Contact guard assist  Distance (ft): 10ft; 100ft  Assistive Device: Rolling walker  Pattern: R Decreased stance time (decreased alejandra speed, guarded posture)    THERAPEUTIC EXERCISES  Lower Extremity Heel slides     Position Supine       Patient End of Session: In bed;Needs met;Call light within reach;RN aware of session/findings    CURRENT GOALS   Goals to be met by: 10/15/23  Patient Goal Patient's self-stated goal is: return to PLOF   Goal #1 Patient is able to demonstrate supine - sit EOB @ level: independent      Goal #1   Current Status  GOAL MET 10/2   Goal #2 Patient is able to demonstrate transfers Sit to/from Stand at assistance level: modified independent with  LRAD      Goal #2  Current Status  CG with RW   Goal #3 Patient is able to ambulate 300 feet with assist device:  LRAD  at assistance level: supervision   Goal #3   Current Status  CG with RW. 100ft 10ft. Goal #4 Patient will negotiate 2 stairs/one curb w/ assistive device and supervision   Goal #4   Current Status  NT   Goal #5 Patient to demonstrate independence with home activity/exercise instructions provided to patient in preparation for discharge.    Goal #5   Current Status  ongoing     Gait Training: 15 minutes  Therapeutic Activity: 10 minutes

## 2023-10-02 NOTE — PROGRESS NOTES
[employer] rep notfied regarding hip spica brace fitting. Faxed required documentation as requested. Will wait for representative to come out to hospital to fit patient for brace.      Update: called [employer] to cancel brace fitting due to patients improvement

## 2023-10-02 NOTE — PLAN OF CARE
Eulene Faster is AxO4. Hip Spica brace ordered last night - nick and araseli to be notified today and needs to size patient appropriately. Numbness and weakness continues to right thigh. Unable to raise leg, pt reports \"its not responding\" when attempting to raise leg. Ambulating with 1 assist, stand pivot rolling chair with gait belt. R leg buckling upon weight bearing. Tramadol provided for pain. No gas, no BM overnight. C/o gas pain and bloating. Simethicone provided. C/o hot flashes and nausea. Zofran provided as needed. Regular diet , had soup and jello for dinner. Drinking lots of water. Voiding freely. Abdominal incision is clean/dry/intact. Saline locked. Lovenox for DVT prophylaxis. Plan is pending medical course. Home with family support and outpatient PT upon discharge. Appropriate safety measures in place. Problem: Patient Centered Care  Goal: Patient preferences are identified and integrated in the patient's plan of care  Description: Interventions:  - What would you like us to know as we care for you? I live at home with my significant other and son.   - Provide timely, complete, and accurate information to patient/family  - Incorporate patient and family knowledge, values, beliefs, and cultural backgrounds into the planning and delivery of care  - Encourage patient/family to participate in care and decision-making at the level they choose  - Honor patient and family perspectives and choices  Outcome: Progressing     Problem: Patient/Family Goals  Goal: Patient/Family Long Term Goal  Description: Patient's Long Term Goal: Discharge from hospital    Interventions:  - Manage pain after surgery  - Regain function and sensation to right leg  -  Be able to ambulate by self  - See additional Care Plan goals for specific interventions  Outcome: Progressing  Goal: Patient/Family Short Term Goal  Description: Patient's Short Term Goal: Manage pain after surgery    Interventions:   - Transition from IV to oral pain medications  - Take periods of rest after periods of activity  - Consume small frequent meals to prevent bloating  - See additional Care Plan goals for specific interventions  Outcome: Progressing     Problem: PAIN - ADULT  Goal: Verbalizes/displays adequate comfort level or patient's stated pain goal  Description: INTERVENTIONS:  - Encourage pt to monitor pain and request assistance  - Assess pain using appropriate pain scale  - Administer analgesics based on type and severity of pain and evaluate response  - Implement non-pharmacological measures as appropriate and evaluate response  - Consider cultural and social influences on pain and pain management  - Manage/alleviate anxiety  - Utilize distraction and/or relaxation techniques  - Monitor for opioid side effects  - Notify MD/LIP if interventions unsuccessful or patient reports new pain  - Anticipate increased pain with activity and pre-medicate as appropriate  Outcome: Progressing     Problem: RISK FOR INFECTION - ADULT  Goal: Absence of fever/infection during anticipated neutropenic period  Description: INTERVENTIONS  - Monitor WBC  - Administer growth factors as ordered  - Implement neutropenic guidelines  Outcome: Progressing     Problem: DISCHARGE PLANNING  Goal: Discharge to home or other facility with appropriate resources  Description: INTERVENTIONS:  - Identify barriers to discharge w/pt and caregiver  - Include patient/family/discharge partner in discharge planning  - Arrange for needed discharge resources and transportation as appropriate  - Identify discharge learning needs (meds, wound care, etc)  - Arrange for interpreters to assist at discharge as needed  - Consider post-discharge preferences of patient/family/discharge partner  - Complete POLST form as appropriate  - Assess patient's ability to be responsible for managing their own health  - Refer to Case Management Department for coordinating discharge planning if the patient needs post-hospital services based on physician/LIP order or complex needs related to functional status, cognitive ability or social support system  Outcome: Progressing     Problem: SAFETY ADULT - FALL  Goal: Free from fall injury  Description: INTERVENTIONS:  - Assess pt frequently for physical needs  - Identify cognitive and physical deficits and behaviors that affect risk of falls.   - [place] fall precautions as indicated by assessment.  - Educate pt/family on patient safety including physical limitations  - Instruct pt to call for assistance with activity based on assessment  - Modify environment to reduce risk of injury  - Provide assistive devices as appropriate  - Consider OT/PT consult to assist with strengthening/mobility  - Encourage toileting schedule  Outcome: Progressing     Problem: SKIN/TISSUE INTEGRITY - ADULT  Goal: Skin integrity remains intact  Description: INTERVENTIONS  - Assess and document risk factors for pressure ulcer development  - Assess and document skin integrity  - Monitor for areas of redness and/or skin breakdown  - Initiate interventions, skin care algorithm/standards of care as needed  Outcome: Progressing  Goal: Incision(s), wounds(s) or drain site(s) healing without S/S of infection  Description: INTERVENTIONS:  - Assess and document risk factors for pressure ulcer development  - Assess and document skin integrity  - Assess and document dressing/incision, wound bed, drain sites and surrounding tissue  - Implement wound care per orders  - Initiate isolation precautions as appropriate  - Initiate Pressure Ulcer prevention bundle as indicated  Outcome: Progressing  Goal: Oral mucous membranes remain intact  Description: INTERVENTIONS  - Assess oral mucosa and hygiene practices  - Implement preventative oral hygiene regimen  - Implement oral medicated treatments as ordered  Outcome: Progressing     Problem: NEUROLOGICAL - ADULT  Goal: Achieves stable or improved neurological status  Description: INTERVENTIONS  - Assess for and report changes in neurological status  - Initiate measures to prevent increased intracranial pressure  - Maintain blood pressure and fluid volume within ordered parameters to optimize cerebral perfusion and minimize risk of hemorrhage  - Monitor temperature, glucose, and sodium.  Initiate appropriate interventions as ordered  Outcome: Progressing  Goal: Achieves maximal functionality and self care  Description: INTERVENTIONS  - Monitor swallowing and airway patency with patient fatigue and changes in neurological status  - Encourage and assist patient to increase activity and self care with guidance from PT/OT  - Encourage visually impaired, hearing impaired and aphasic patients to use assistive/communication devices  Outcome: Progressing

## 2023-10-03 VITALS
DIASTOLIC BLOOD PRESSURE: 73 MMHG | RESPIRATION RATE: 14 BRPM | HEART RATE: 84 BPM | OXYGEN SATURATION: 99 % | SYSTOLIC BLOOD PRESSURE: 117 MMHG | HEIGHT: 65 IN | BODY MASS INDEX: 21.96 KG/M2 | TEMPERATURE: 99 F | WEIGHT: 131.81 LBS

## 2023-10-03 LAB
BASOPHILS # BLD AUTO: 0.01 X10(3) UL (ref 0–0.2)
BASOPHILS NFR BLD AUTO: 0.1 %
BLOOD TYPE BARCODE: 6200
DEPRECATED RDW RBC AUTO: 45.4 FL (ref 35.1–46.3)
EOSINOPHIL # BLD AUTO: 0.01 X10(3) UL (ref 0–0.7)
EOSINOPHIL NFR BLD AUTO: 0.1 %
ERYTHROCYTE [DISTWIDTH] IN BLOOD BY AUTOMATED COUNT: 18.4 % (ref 11–15)
HCT VFR BLD AUTO: 28.9 %
HGB BLD-MCNC: 8.2 G/DL
IMM GRANULOCYTES # BLD AUTO: 0.01 X10(3) UL (ref 0–1)
IMM GRANULOCYTES NFR BLD: 0.1 %
LYMPHOCYTES # BLD AUTO: 0.9 X10(3) UL (ref 1–4)
LYMPHOCYTES NFR BLD AUTO: 11.5 %
MCH RBC QN AUTO: 19.9 PG (ref 26–34)
MCHC RBC AUTO-ENTMCNC: 28.4 G/DL (ref 31–37)
MCV RBC AUTO: 70 FL
MONOCYTES # BLD AUTO: 0.61 X10(3) UL (ref 0.1–1)
MONOCYTES NFR BLD AUTO: 7.8 %
NEUTROPHILS # BLD AUTO: 6.26 X10 (3) UL (ref 1.5–7.7)
NEUTROPHILS # BLD AUTO: 6.26 X10(3) UL (ref 1.5–7.7)
NEUTROPHILS NFR BLD AUTO: 80.4 %
PLATELET # BLD AUTO: 526 10(3)UL (ref 150–450)
RBC # BLD AUTO: 4.13 X10(6)UL
UNIT VOLUME: 350 ML
WBC # BLD AUTO: 7.8 X10(3) UL (ref 4–11)

## 2023-10-03 RX ORDER — SIMETHICONE 80 MG
80 TABLET,CHEWABLE ORAL 2 TIMES DAILY PRN
Status: SHIPPED | COMMUNITY
Start: 2023-10-03

## 2023-10-03 NOTE — PLAN OF CARE
Ambulating continues to improve. Tolerating diet. Denies need for prn medication for pain. Voiding freely. Surgical incision clean,dry,intact. + gas, +BM this morning. VSS. Reviewed DC instruction, follow ups, and medications reviewed with patient. Sent home with DC paperwork and all belongings in stable condition. Problem: Patient Centered Care  Goal: Patient preferences are identified and integrated in the patient's plan of care  Description: Interventions:  - What would you like us to know as we care for you? I live at home with my significant other and son.   - Provide timely, complete, and accurate information to patient/family  - Incorporate patient and family knowledge, values, beliefs, and cultural backgrounds into the planning and delivery of care  - Encourage patient/family to participate in care and decision-making at the level they choose  - Honor patient and family perspectives and choices  Outcome: Adequate for Discharge     Problem: Patient/Family Goals  Goal: Patient/Family Long Term Goal  Description: Patient's Long Term Goal: Discharge from hospital    Interventions:  - Manage pain after surgery  - Regain function and sensation to right leg  -  Be able to ambulate by self  - See additional Care Plan goals for specific interventions  Outcome: Adequate for Discharge  Goal: Patient/Family Short Term Goal  Description: Patient's Short Term Goal: Manage pain after surgery    Interventions:   - Transition from IV to oral pain medications  - Take periods of rest after periods of activity  - Consume small frequent meals to prevent bloating  - See additional Care Plan goals for specific interventions  Outcome: Adequate for Discharge     Problem: PAIN - ADULT  Goal: Verbalizes/displays adequate comfort level or patient's stated pain goal  Description: INTERVENTIONS:  - Encourage pt to monitor pain and request assistance  - Assess pain using appropriate pain scale  - Administer analgesics based on type and severity of pain and evaluate response  - Implement non-pharmacological measures as appropriate and evaluate response  - Consider cultural and social influences on pain and pain management  - Manage/alleviate anxiety  - Utilize distraction and/or relaxation techniques  - Monitor for opioid side effects  - Notify MD/LIP if interventions unsuccessful or patient reports new pain  - Anticipate increased pain with activity and pre-medicate as appropriate  Outcome: Adequate for Discharge     Problem: RISK FOR INFECTION - ADULT  Goal: Absence of fever/infection during anticipated neutropenic period  Description: INTERVENTIONS  - Monitor WBC  - Administer growth factors as ordered  - Implement neutropenic guidelines  Outcome: Adequate for Discharge     Problem: DISCHARGE PLANNING  Goal: Discharge to home or other facility with appropriate resources  Description: INTERVENTIONS:  - Identify barriers to discharge w/pt and caregiver  - Include patient/family/discharge partner in discharge planning  - Arrange for needed discharge resources and transportation as appropriate  - Identify discharge learning needs (meds, wound care, etc)  - Arrange for interpreters to assist at discharge as needed  - Consider post-discharge preferences of patient/family/discharge partner  - Complete POLST form as appropriate  - Assess patient's ability to be responsible for managing their own health  - Refer to Case Management Department for coordinating discharge planning if the patient needs post-hospital services based on physician/LIP order or complex needs related to functional status, cognitive ability or social support system  Outcome: Adequate for Discharge     Problem: SKIN/TISSUE INTEGRITY - ADULT  Goal: Skin integrity remains intact  Description: INTERVENTIONS  - Assess and document risk factors for pressure ulcer development  - Assess and document skin integrity  - Monitor for areas of redness and/or skin breakdown  - Initiate interventions, skin care algorithm/standards of care as needed  Outcome: Adequate for Discharge  Goal: Incision(s), wounds(s) or drain site(s) healing without S/S of infection  Description: INTERVENTIONS:  - Assess and document risk factors for pressure ulcer development  - Assess and document skin integrity  - Assess and document dressing/incision, wound bed, drain sites and surrounding tissue  - Implement wound care per orders  - Initiate isolation precautions as appropriate  - Initiate Pressure Ulcer prevention bundle as indicated  Outcome: Adequate for Discharge  Goal: Oral mucous membranes remain intact  Description: INTERVENTIONS  - Assess oral mucosa and hygiene practices  - Implement preventative oral hygiene regimen  - Implement oral medicated treatments as ordered  Outcome: Adequate for Discharge     Problem: SAFETY ADULT - FALL  Goal: Free from fall injury  Description: INTERVENTIONS:  - Assess pt frequently for physical needs  - Identify cognitive and physical deficits and behaviors that affect risk of falls. - [place] fall precautions as indicated by assessment.  - Educate pt/family on patient safety including physical limitations  - Instruct pt to call for assistance with activity based on assessment  - Modify environment to reduce risk of injury  - Provide assistive devices as appropriate  - Consider OT/PT consult to assist with strengthening/mobility  - Encourage toileting schedule  Outcome: Adequate for Discharge     Problem: NEUROLOGICAL - ADULT  Goal: Achieves stable or improved neurological status  Description: INTERVENTIONS  - Assess for and report changes in neurological status  - Initiate measures to prevent increased intracranial pressure  - Maintain blood pressure and fluid volume within ordered parameters to optimize cerebral perfusion and minimize risk of hemorrhage  - Monitor temperature, glucose, and sodium.  Initiate appropriate interventions as ordered  Outcome: Adequate for Discharge  Goal: Achieves maximal functionality and self care  Description: INTERVENTIONS  - Monitor swallowing and airway patency with patient fatigue and changes in neurological status  - Encourage and assist patient to increase activity and self care with guidance from PT/OT  - Encourage visually impaired, hearing impaired and aphasic patients to use assistive/communication devices  Outcome: Adequate for Discharge

## 2023-10-03 NOTE — PLAN OF CARE
Patient's status is improving. Tolerating diet without complaints of n/v. Denies pain at this time. Eager to be discharged home. Call light is within reach. Ambulates with walker to void freely. Slept well throughout the shift. Problem: Patient Centered Care  Goal: Patient preferences are identified and integrated in the patient's plan of care  Description: Interventions:  - What would you like us to know as we care for you? I live at home with my significant other and son.   - Provide timely, complete, and accurate information to patient/family  - Incorporate patient and family knowledge, values, beliefs, and cultural backgrounds into the planning and delivery of care  - Encourage patient/family to participate in care and decision-making at the level they choose  - Honor patient and family perspectives and choices  Outcome: Progressing     Problem: Patient/Family Goals  Goal: Patient/Family Long Term Goal  Description: Patient's Long Term Goal: Discharge from hospital    Interventions:  - Manage pain after surgery  - Regain function and sensation to right leg  -  Be able to ambulate by self  - See additional Care Plan goals for specific interventions  Outcome: Progressing  Goal: Patient/Family Short Term Goal  Description: Patient's Short Term Goal: Manage pain after surgery    Interventions:   - Transition from IV to oral pain medications  - Take periods of rest after periods of activity  - Consume small frequent meals to prevent bloating  - See additional Care Plan goals for specific interventions  Outcome: Progressing     Problem: PAIN - ADULT  Goal: Verbalizes/displays adequate comfort level or patient's stated pain goal  Description: INTERVENTIONS:  - Encourage pt to monitor pain and request assistance  - Assess pain using appropriate pain scale  - Administer analgesics based on type and severity of pain and evaluate response  - Implement non-pharmacological measures as appropriate and evaluate response  - Consider cultural and social influences on pain and pain management  - Manage/alleviate anxiety  - Utilize distraction and/or relaxation techniques  - Monitor for opioid side effects  - Notify MD/LIP if interventions unsuccessful or patient reports new pain  - Anticipate increased pain with activity and pre-medicate as appropriate  Outcome: Progressing     Problem: RISK FOR INFECTION - ADULT  Goal: Absence of fever/infection during anticipated neutropenic period  Description: INTERVENTIONS  - Monitor WBC  - Administer growth factors as ordered  - Implement neutropenic guidelines  Outcome: Progressing     Problem: DISCHARGE PLANNING  Goal: Discharge to home or other facility with appropriate resources  Description: INTERVENTIONS:  - Identify barriers to discharge w/pt and caregiver  - Include patient/family/discharge partner in discharge planning  - Arrange for needed discharge resources and transportation as appropriate  - Identify discharge learning needs (meds, wound care, etc)  - Arrange for interpreters to assist at discharge as needed  - Consider post-discharge preferences of patient/family/discharge partner  - Complete POLST form as appropriate  - Assess patient's ability to be responsible for managing their own health  - Refer to Case Management Department for coordinating discharge planning if the patient needs post-hospital services based on physician/LIP order or complex needs related to functional status, cognitive ability or social support system  Outcome: Progressing     Problem: SKIN/TISSUE INTEGRITY - ADULT  Goal: Skin integrity remains intact  Description: INTERVENTIONS  - Assess and document risk factors for pressure ulcer development  - Assess and document skin integrity  - Monitor for areas of redness and/or skin breakdown  - Initiate interventions, skin care algorithm/standards of care as needed  Outcome: Progressing  Goal: Incision(s), wounds(s) or drain site(s) healing without S/S of infection  Description: INTERVENTIONS:  - Assess and document risk factors for pressure ulcer development  - Assess and document skin integrity  - Assess and document dressing/incision, wound bed, drain sites and surrounding tissue  - Implement wound care per orders  - Initiate isolation precautions as appropriate  - Initiate Pressure Ulcer prevention bundle as indicated  Outcome: Progressing  Goal: Oral mucous membranes remain intact  Description: INTERVENTIONS  - Assess oral mucosa and hygiene practices  - Implement preventative oral hygiene regimen  - Implement oral medicated treatments as ordered  Outcome: Progressing     Problem: SAFETY ADULT - FALL  Goal: Free from fall injury  Description: INTERVENTIONS:  - Assess pt frequently for physical needs  - Identify cognitive and physical deficits and behaviors that affect risk of falls. - [place] fall precautions as indicated by assessment.  - Educate pt/family on patient safety including physical limitations  - Instruct pt to call for assistance with activity based on assessment  - Modify environment to reduce risk of injury  - Provide assistive devices as appropriate  - Consider OT/PT consult to assist with strengthening/mobility  - Encourage toileting schedule  Outcome: Progressing     Problem: NEUROLOGICAL - ADULT  Goal: Achieves stable or improved neurological status  Description: INTERVENTIONS  - Assess for and report changes in neurological status  - Initiate measures to prevent increased intracranial pressure  - Maintain blood pressure and fluid volume within ordered parameters to optimize cerebral perfusion and minimize risk of hemorrhage  - Monitor temperature, glucose, and sodium.  Initiate appropriate interventions as ordered  Outcome: Progressing  Goal: Achieves maximal functionality and self care  Description: INTERVENTIONS  - Monitor swallowing and airway patency with patient fatigue and changes in neurological status  - Encourage and assist patient to increase activity and self care with guidance from PT/OT  - Encourage visually impaired, hearing impaired and aphasic patients to use assistive/communication devices  Outcome: Progressing

## 2023-10-03 NOTE — PHYSICAL THERAPY NOTE
PHYSICAL THERAPY TREATMENT NOTE - INPATIENT     Room Number: 442/442-A       Presenting Problem: Uterine leiomyoma:  Patient with a history of uterine mass, underwent hysterectomy on 9/29. Immediately after recovery she started noticing right leg weakness. Numbness is over the right thigh. Problem List  Principal Problem:    Uterine leiomyoma  Active Problems:    Femoral nerve palsy, right      PHYSICAL THERAPY ASSESSMENT     Patient received supine in bed. RN approved activity. Patient reporting improved RLE strength with no episodes of R knee buckling during ambulation with RN staff. Continues to require support from walker to promote stability during standing/ambulation. Continues to present with numbness on medial R thigh distally to medial aspect of ankle/foot. Bed mobility: Independent supine<>sit. Transfers: Mod I with use of RW. Ambulation: SBA with use of RW. 300ft. Fluid and steady gait pattern. Noted hesitancy with weight bearing on RLE, however, no episodes of R knee buckling or requiring therapist to block knee to prevent buckling. Stair negotiation: CG with use of B HR's. 4 stairs. Instruction on appropriate pattern/sequencing technique. Patient in bed at end of session with needs in reach. Patient making progress toward IP PT goals. The patient's Approx Degree of Impairment: 28.97% has been calculated based on documentation in the [facility] '6 clicks' Inpatient Basic Mobility Short Form. Research supports that patients with this level of impairment may benefit from home with outpatient PT.    DISCHARGE RECOMMENDATIONS  PT Discharge Recommendations: Home; Intermittent Supervision; Outpatient PT     PLAN  PT Treatment Plan: Body mechanics; Bed mobility; Endurance; Energy conservation;Patient education; Family education;Gait training;Range of motion;Strengthening;Stoop training;Neuromuscular re-educate;Stair training;Transfer training;Balance training  Frequency (Obs): 5x/week    SUBJECTIVE  \"I'm ready to go home! \"    OBJECTIVE  Precautions: Abdominal protective strategies      PAIN ASSESSMENT   Ratin  Location: abdomen  Management Techniques: Activity promotion; Body mechanics;Repositioning    BALANCE  Static Sitting: Normal  Dynamic Sitting: Normal  Static Standing: Good  Dynamic Standing: Fair +    ACTIVITY TOLERANCE                          O2 WALK       AM-PAC '6-Clicks' INPATIENT SHORT FORM - BASIC MOBILITY  How much difficulty does the patient currently have. .. Patient Difficulty: Turning over in bed (including adjusting bedclothes, sheets and blankets)?: None   Patient Difficulty: Sitting down on and standing up from a chair with arms (e.g., wheelchair, bedside commode, etc.): None   Patient Difficulty: Moving from lying on back to sitting on the side of the bed?: None   How much help from another person does the patient currently need. ..    Help from Another: Moving to and from a bed to a chair (including a wheelchair)?: A Little   Help from Another: Need to walk in hospital room?: A Little   Help from Another: Climbing 3-5 steps with a railing?: A Little     AM-PAC Score:  Raw Score: 21   Approx Degree of Impairment: 28.97%   Standardized Score (AM-PAC Scale): 50.25   CMS Modifier (G-Code): CJ    FUNCTIONAL ABILITY STATUS  Functional Mobility/Gait Assessment  Gait Assistance:  (SBA)  Distance (ft): 300ft  Assistive Device: Rolling walker  Pattern: R Decreased stance time  Stairs: Stairs  How Many Stairs: 4  Device: 2 Rails  Assist: Contact guard assist  Pattern: Ascend and Descend  Ascend and Descend : Step to    THERAPEUTIC EXERCISES  Lower Extremity Heel slides     Position Supine       Patient End of Session: In bed;Needs met;Call light within reach;RN aware of session/findings    CURRENT GOALS   Goals to be met by: 10/15/23  Patient Goal Patient's self-stated goal is: return to PLOF   Goal #1 Patient is able to demonstrate supine - sit EOB @ level: independent Goal #1   Current Status  GOAL MET 10/2   Goal #2 Patient is able to demonstrate transfers Sit to/from Stand at assistance level: modified independent with  LRAD      Goal #2  Current Status  GOAL MET: Mod I w RW   Goal #3 Patient is able to ambulate 300 feet with assist device:  LRAD  at assistance level: supervision   Goal #3   Current Status  SBA with RW 300ft   Goal #4 Patient will negotiate 2 stairs/one curb w/ assistive device and supervision   Goal #4   Current Status  4 stairs: CG    Goal #5 Patient to demonstrate independence with home activity/exercise instructions provided to patient in preparation for discharge.    Goal #5   Current Status  ongoing      Gait Training: 15 minutes  Therapeutic Activity: 10 minutes

## 2023-10-03 NOTE — PLAN OF CARE
Pt improving, tolerating diet. +gas. Denies need for prn nausea/pain meds. Ambulating much better- per therapy pt does not need brace. Updated Dr. Chayo Talavera and Bonnie Guerra and Quoc Garcia. Voiding freely. Fall precautions in place. VSS. Potential dc home tomorrow. Problem: Patient Centered Care  Goal: Patient preferences are identified and integrated in the patient's plan of care  Description: Interventions:  - What would you like us to know as we care for you? I live at home with my significant other and son.   - Provide timely, complete, and accurate information to patient/family  - Incorporate patient and family knowledge, values, beliefs, and cultural backgrounds into the planning and delivery of care  - Encourage patient/family to participate in care and decision-making at the level they choose  - Honor patient and family perspectives and choices  Outcome: Progressing     Problem: Patient/Family Goals  Goal: Patient/Family Long Term Goal  Description: Patient's Long Term Goal: Discharge from hospital    Interventions:  - Manage pain after surgery  - Regain function and sensation to right leg  -  Be able to ambulate by self  - See additional Care Plan goals for specific interventions  Outcome: Progressing  Goal: Patient/Family Short Term Goal  Description: Patient's Short Term Goal: Manage pain after surgery    Interventions:   - Transition from IV to oral pain medications  - Take periods of rest after periods of activity  - Consume small frequent meals to prevent bloating  - See additional Care Plan goals for specific interventions  Outcome: Progressing     Problem: PAIN - ADULT  Goal: Verbalizes/displays adequate comfort level or patient's stated pain goal  Description: INTERVENTIONS:  - Encourage pt to monitor pain and request assistance  - Assess pain using appropriate pain scale  - Administer analgesics based on type and severity of pain and evaluate response  - Implement non-pharmacological measures as appropriate and evaluate response  - Consider cultural and social influences on pain and pain management  - Manage/alleviate anxiety  - Utilize distraction and/or relaxation techniques  - Monitor for opioid side effects  - Notify MD/LIP if interventions unsuccessful or patient reports new pain  - Anticipate increased pain with activity and pre-medicate as appropriate  Outcome: Progressing     Problem: RISK FOR INFECTION - ADULT  Goal: Absence of fever/infection during anticipated neutropenic period  Description: INTERVENTIONS  - Monitor WBC  - Administer growth factors as ordered  - Implement neutropenic guidelines  Outcome: Progressing     Problem: DISCHARGE PLANNING  Goal: Discharge to home or other facility with appropriate resources  Description: INTERVENTIONS:  - Identify barriers to discharge w/pt and caregiver  - Include patient/family/discharge partner in discharge planning  - Arrange for needed discharge resources and transportation as appropriate  - Identify discharge learning needs (meds, wound care, etc)  - Arrange for interpreters to assist at discharge as needed  - Consider post-discharge preferences of patient/family/discharge partner  - Complete POLST form as appropriate  - Assess patient's ability to be responsible for managing their own health  - Refer to Case Management Department for coordinating discharge planning if the patient needs post-hospital services based on physician/LIP order or complex needs related to functional status, cognitive ability or social support system  Outcome: Progressing     Problem: SKIN/TISSUE INTEGRITY - ADULT  Goal: Skin integrity remains intact  Description: INTERVENTIONS  - Assess and document risk factors for pressure ulcer development  - Assess and document skin integrity  - Monitor for areas of redness and/or skin breakdown  - Initiate interventions, skin care algorithm/standards of care as needed  Outcome: Progressing  Goal: Incision(s), wounds(s) or drain site(s) healing without S/S of infection  Description: INTERVENTIONS:  - Assess and document risk factors for pressure ulcer development  - Assess and document skin integrity  - Assess and document dressing/incision, wound bed, drain sites and surrounding tissue  - Implement wound care per orders  - Initiate isolation precautions as appropriate  - Initiate Pressure Ulcer prevention bundle as indicated  Outcome: Progressing  Goal: Oral mucous membranes remain intact  Description: INTERVENTIONS  - Assess oral mucosa and hygiene practices  - Implement preventative oral hygiene regimen  - Implement oral medicated treatments as ordered  Outcome: Progressing     Problem: SAFETY ADULT - FALL  Goal: Free from fall injury  Description: INTERVENTIONS:  - Assess pt frequently for physical needs  - Identify cognitive and physical deficits and behaviors that affect risk of falls. - [place] fall precautions as indicated by assessment.  - Educate pt/family on patient safety including physical limitations  - Instruct pt to call for assistance with activity based on assessment  - Modify environment to reduce risk of injury  - Provide assistive devices as appropriate  - Consider OT/PT consult to assist with strengthening/mobility  - Encourage toileting schedule  Outcome: Progressing     Problem: NEUROLOGICAL - ADULT  Goal: Achieves stable or improved neurological status  Description: INTERVENTIONS  - Assess for and report changes in neurological status  - Initiate measures to prevent increased intracranial pressure  - Maintain blood pressure and fluid volume within ordered parameters to optimize cerebral perfusion and minimize risk of hemorrhage  - Monitor temperature, glucose, and sodium.  Initiate appropriate interventions as ordered  Outcome: Progressing  Goal: Achieves maximal functionality and self care  Description: INTERVENTIONS  - Monitor swallowing and airway patency with patient fatigue and changes in neurological status  - Encourage and assist patient to increase activity and self care with guidance from PT/OT  - Encourage visually impaired, hearing impaired and aphasic patients to use assistive/communication devices  Outcome: Progressing

## 2023-10-04 ENCOUNTER — PATIENT OUTREACH (OUTPATIENT)
Dept: CASE MANAGEMENT | Age: 48
End: 2023-10-04

## 2023-10-04 ENCOUNTER — TELEPHONE (OUTPATIENT)
Dept: FAMILY MEDICINE CLINIC | Facility: CLINIC | Age: 48
End: 2023-10-04

## 2023-10-04 ENCOUNTER — TELEPHONE (OUTPATIENT)
Dept: INTERNAL MEDICINE UNIT | Facility: HOSPITAL | Age: 48
End: 2023-10-04

## 2023-10-04 ENCOUNTER — ORDER TRANSCRIPTION (OUTPATIENT)
Dept: PHYSICAL THERAPY | Facility: HOSPITAL | Age: 48
End: 2023-10-04

## 2023-10-04 DIAGNOSIS — G57.21 FEMORAL NERVE PALSY, RIGHT: Primary | ICD-10-CM

## 2023-10-04 DIAGNOSIS — G57.21 FEMORAL NERVE PALSY, RIGHT: ICD-10-CM

## 2023-10-04 DIAGNOSIS — Z02.9 ENCOUNTERS FOR ADMINISTRATIVE PURPOSE: Primary | ICD-10-CM

## 2023-10-04 NOTE — TELEPHONE ENCOUNTER
Call received from [facility] pt PT. Requesting order for outpatient Physical therapy be sent to the. Note in chart Internal order entered by Dr Selina Baxter for [facility] only, for post surgical rt femoral nerve palsy. Discussed w Oliver Baerd whom requested an external referral order be pended to her. Routing pended order to [facility] as requested.

## 2023-10-04 NOTE — TELEPHONE ENCOUNTER
Spoke to pt for TCM today. Pt does not have an appointment scheduled at this time. [facility] attempted to schedule a TCM/HFU for the patient however no available openings within the recommended TCM time frame. The patient states she is unable to attend an appointment next week however can the following week. NCM offered an appointment with [facility] and she declined, she prefers to see Dr. Ty Walker at this time. TCM/HFU appt recommended by 10/17/2023 . BOOK BY DATE (last date for TCM): 10/17/2023     Clinical staff: Please advise. Please f/u with pt and try to get them to schedule as pt would greatly benefit from TCM/HFU appt. Thank you!

## 2023-10-09 ENCOUNTER — TELEPHONE (OUTPATIENT)
Dept: PHYSICAL THERAPY | Facility: HOSPITAL | Age: 48
End: 2023-10-09

## 2023-10-09 ENCOUNTER — OFFICE VISIT (OUTPATIENT)
Dept: FAMILY MEDICINE CLINIC | Facility: CLINIC | Age: 48
End: 2023-10-09
Payer: COMMERCIAL

## 2023-10-09 VITALS
DIASTOLIC BLOOD PRESSURE: 64 MMHG | SYSTOLIC BLOOD PRESSURE: 110 MMHG | BODY MASS INDEX: 22.66 KG/M2 | OXYGEN SATURATION: 98 % | HEART RATE: 88 BPM | WEIGHT: 136 LBS | HEIGHT: 65 IN | RESPIRATION RATE: 16 BRPM

## 2023-10-09 DIAGNOSIS — Z90.710 STATUS POST HYSTERECTOMY: ICD-10-CM

## 2023-10-09 DIAGNOSIS — G57.21 FEMORAL NERVE PALSY, RIGHT: Primary | ICD-10-CM

## 2023-10-09 PROCEDURE — 3074F SYST BP LT 130 MM HG: CPT | Performed by: NURSE PRACTITIONER

## 2023-10-09 PROCEDURE — 99214 OFFICE O/P EST MOD 30 MIN: CPT | Performed by: NURSE PRACTITIONER

## 2023-10-09 PROCEDURE — 3008F BODY MASS INDEX DOCD: CPT | Performed by: NURSE PRACTITIONER

## 2023-10-09 PROCEDURE — 3078F DIAST BP <80 MM HG: CPT | Performed by: NURSE PRACTITIONER

## 2023-10-10 ENCOUNTER — OFFICE VISIT (OUTPATIENT)
Dept: PHYSICAL THERAPY | Age: 48
End: 2023-10-10
Attending: NURSE PRACTITIONER
Payer: COMMERCIAL

## 2023-10-10 DIAGNOSIS — G57.21 FEMORAL NERVE PALSY, RIGHT: Primary | ICD-10-CM

## 2023-10-10 PROCEDURE — 97110 THERAPEUTIC EXERCISES: CPT

## 2023-10-10 PROCEDURE — 97112 NEUROMUSCULAR REEDUCATION: CPT

## 2023-10-10 PROCEDURE — 97161 PT EVAL LOW COMPLEX 20 MIN: CPT

## 2023-10-10 NOTE — PROGRESS NOTES
PHYSICAL THERAPY INITIAL EVALUATION     Date of service: 10/10/2023  Dx: Femoral nerve palsy, right (G57.21)        Insurance: [employer] Limits: auth required  Visit #: 1  Authorized # of Visits: 12  POC/Auth Expiration: 12/31/23  Authorizing Physician/Provider: Stephon Amin     PATIENT SUMMARY     History/PACO: \"I had a hysterectomy and they stretched the femoral nerve. I woke up and couldn't feel my leg. The communication from my brain to the leg is not there. I'm now actually able to raise the leg now off the bed if I'm lying down. \" She reports difficulty navigating stairs and driving. \"I'm staying at [facility] mother's and I really want to go back home. I'm very active and this is really limiting me. \"     She reports a few falls due to instability initially after her surgery. Shireen Rich were supposed to put me in a brace but they didn't have anything small enough. \"    HEP: Heel slides and walking    DOI/S: 9/29/23    Aggravating Factors: stairs, walking, lifting her leg in/out of car    Alleviating Factors: no medication, no pain or swelling    PMH: The patient's PMH was reviewed with the patient including allergies, medications, and surgical and medical history. Patient  has a past medical history of Abnormal Pap smear of cervix (1997), Calculus of kidney, and Migraines. PLF/Personal Goals: Patient would like to be able to walk normally but would ultimately like to be able to walk 4 miles a day, yoga, gym workouts, biking. 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 OBJECTIVE:     Pain/Symptom Presentation: Patient denies any pain. Pain at rest: 0/10  Pain at worst: 0/10    Activity Measures:  Sleeping: Mild Activity Limitation: Patient reports that she has to sleep with a pillow between her knees due to numbness. Car Transfers:  Moderate Activity Limitation: Patient reports that she has to help lift her right leg to get in and out of the car. Driving: Extreme Activity Limitation: Patient is unable to drive. Walking: Moderate Activity Limitation: Patient is able to walk up to 1.25 miles. Ascending Stairs: Moderate Activity Limitation: Patient navigates up the stairs with step-to gait pattern, with lateral movements. Descending Stairs: Moderate Activity Limitation: Patient navigates down the stairs with step-to gait pattern, moving laterally. Inspection/Observation: Patient demonstrates no apparent swelling at her knee. Gait: Patient ambulates with mildly antalgic gait pattern, limiting TKE at heel strike. Palpation: Patient demonstrates some difficulty with quad recruitment, tending to compensate with her glute and hip extension, and demonstrates a fleeting contraction during quad set. Sensation: Patient reports diminished sensation at her knee and travelling into the medial lower leg.      ROM:     Knee Motion PROM AROM    Right Left Right Left   Knee Extension 0^ 0 0 0   Knee Flexion [facility] WFL   *indicates activity was associated with pain   ^Additional effort required for active knee extension     Ankle Motion PROM AROM    Right Left Right Left   Ankle OKC DF (knee straight) N/A N/A 10 10   Ankle OKC PF N/A N/A 50 50   Great Toe Ext [facility] WFL N/A N/A   *indicates activity was associated with pain     Strength/MMT:  Knee Motion Strength    Right Left   Knee Extension 3-/5 5/5   Knee Flexion 4/5 5/5   *indicates activity was associated with pain      Hip Motion Strength    Right Left   Hip Flexion 4-/5 5/5   Hip Extension 4-/5 4/5   Hip ABD 4-/5 4/5   *indicates activity was associated with pain     Strength/MMT:   Ankle Motion Strength    Right Left   Ankle OKC DF 5/5 5/5   Ankle OKC PF 5/5 5/5   Ankle OKC INV 5/5 5/5   Ankle OKC EV 5/5 5/5   SLHR N/A N/A   *indicates activity was associated with pain     ASSESSMENT:     Sarah Valencia is a 46year old female that presents to physical therapy evaluation s/p hysterectomy 9/29/23 that suffered from a femoral nerve palsy and is with residual strength and stability issues at her right knee. The patient demonstrates difficulty with quad recruitment and activation which does cause some instability episodes at her knee. Current functional limitation include but are not limited to lifting her leg in and out of the car, driving, walking long distances, navigating stairs, and squatting. The patient is currently staying with her mom and is anxious to return home. She is not currently driving. 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 She mentioned that when she moves home, it may be more convenient to continue therapy at another location closer to her home, which we will help facilitate as able. The patient would benefit from physical therapy to facilitate improved quad and hip flexor strength to facilitate normalized gait pattern as well as endurance and stability for full return to ADL's, PLF, and occupational activities. Precautions/WB Status: WBAT  Education or Treatment Limitation(s): None  Rehab Potential: Good    TREATMENT:     Initial Evaluation: x 20min     Therapeutic Exercise: x 10min  Supine marching: 2 x 10 (B)   Patient Education: Patient was educated on anatomy and pathophysiology of current condition, rationale for physical therapy, anticipated treatment interventions, prognosis, timeline for recovery, and expected functional outcomes based on evaluative findings. Patient was educated on the importance of compliance with consistent treatment and HEP to achieve mutually established goals. Administered HEP: Issued and reviewed HEP handout, exercise selection, and recommended resistance. Provided verbal and written instructions/cueing for proper technique and common errors/compensations as needed.     Neuromuscular Re-education: x 10min  Quad set: 2 x 10 x 3\" with extensive cueing and instruction for activation   SAQ: x 20 (R)   Hooklying PPT: 2 x 10 x 3\"     Home Exercise Program: Patient was issued a HEP handout 10/10/2023 including     Provider Interactions With Patient:   Patient education was provided as described above. All patient's questions were answered and the patient denied further comments, complaints, or concerns upon departure. Patient was issued an appt list and verbally confirmed the next appt date and time to ensure consistency with physical therapy attendance. Charges: PT Eval Low Complexity Complexity x 1, Therex x 1, NMR x 1   Total Timed Treatment: 20min       Total Treatment Time: 40min     PLAN OF CARE:      Goals:  Short-Term Goals:  Patient will demonstrate 20 SLR's without quad lag to facilitate proper quad activation and stabilization strength for lifting her leg in and out of the car. Timeframe: 2 weeks. The patient will improve quad strength and single-leg stance stability to demonstrate non-antalgic gait pattern with walking medium-length distances for household ambulation. Timeframe: 3-4 weeks. Long-Term Goals: The patient will improve LE strength and endurance to facilitate walking distances of 3 mile(s) daily for household and community ambulation. Timeframe: 6 weeks. Patient will improve lower motor control to perform double-leg squat with symmetrical loading, without asymmetries, and with proper posterior chain loading to facilitate squatting and lifting ADL's. Timeframe: 6 weeks. Patient will improve LE mobility, strength, and single-leg stabilization to meet strength requirements for reciprocal stair navigation pattern with no asymmetries for ascending and descending 5 flights of stairs daily for [household and community ambulation, community integration]. Timeframe: 6 weeks.     Plan Frequency / Duration: Patient will be seen for 2x/week for 6 weeks, for a total of 12 visits, over a 90 day period. We will re-evaluate the patient at that time in order to determine functional progress, evaluate short-term goal completion, and establish an updated plan of care. Possible treatment interventions will/may include: Therapeutic Activities, Therapeutic Exercise, Neuromuscular Re-education, Manual Therapy, Home Exercise Program Instruction, Patient Education, Self-Care/Home Management, Gait Training, and Modalities as needed. Patient/Family was advised of these findings, precautions, and treatment options and has agreed to actively participate in planning and for this course of care. Thank you for your referral. Please co-sign or sign and return this letter via fax as soon as possible to [phone]. If you have any questions, please contact me at Dept: [phone]. Sincerely,    X___Dolly Zafar PT, DPT, SCS, ATC, CSCS____ Date: __10/10/2023__________________    Electronically signed by therapist: Luis F Hair PT  [de-identified] certification required: Yes  I certify the need for these services furnished under this plan of treatment and while under my care.

## 2023-10-10 NOTE — PATIENT INSTRUCTIONS
Thank you for coming to your physical therapy appt! During your appt today you were issued a HEP handout from [employer] which can also be accessed using the information below. The exercises chosen are meant to supplement the treatment you received and reinforce the progress made in physical therapy. It is important to stay consistent with your exercises to help facilitate and maximize your recovery! View at www.[employer]. [employer] using code: [email or web address]

## 2023-10-12 ENCOUNTER — TELEPHONE (OUTPATIENT)
Dept: FAMILY MEDICINE CLINIC | Facility: CLINIC | Age: 48
End: 2023-10-12

## 2023-10-12 ENCOUNTER — OFFICE VISIT (OUTPATIENT)
Dept: PHYSICAL THERAPY | Age: 48
End: 2023-10-12
Attending: NURSE PRACTITIONER
Payer: COMMERCIAL

## 2023-10-12 PROCEDURE — 97112 NEUROMUSCULAR REEDUCATION: CPT

## 2023-10-12 PROCEDURE — 97110 THERAPEUTIC EXERCISES: CPT

## 2023-10-12 NOTE — TELEPHONE ENCOUNTER
Pt has approved PT referral. She is going to a [facility] location but due to a move she would like to go to [facility]. When she went to schedule this she was told a new referral would be needed. I told her that I did not think this was the case as she already has an approved referral and is only specific to [address] [place] [address] further location needed. Pt says she is going to call and try to schedule again. Will call back if anything further needed.

## 2023-10-12 NOTE — PATIENT INSTRUCTIONS
Thank you for coming to your physical therapy appt! During your appt today you were issued a HEP handout from [employer] which can also be accessed using the information below. The exercises chosen are meant to supplement the treatment you received and reinforce the progress made in physical therapy. It is important to stay consistent with your exercises to help facilitate and maximize your recovery! View at [email or web address]. com using code: Lata Heredia

## 2023-10-12 NOTE — TELEPHONE ENCOUNTER
Patient called the back line and stated PT did not accept her PT referral. She was told she needed a new referral. PT is still [facility], location is [place]. Please advise.

## 2023-10-12 NOTE — PROGRESS NOTES
Date of Service: 10/12/2023  Dx: Femoral nerve palsy, right (G57.21)      DOI/S: 9/29/23        Insurance: [address] [place] [address] Limits: auth required     Visit #: 2  Authorized # of Visits: 12  POC/Auth Expiration: 12/31/23  Date of Last PN: 10/10/23 (Visit #1)  Authorizing Physician/Provider: Edward Scott MD visit: N/A  Fall Risk: Slightly increased due to instability at knee         Precautions: None            Subjective: \"I did the exercises yesterday morning. I walked 2 miles yesterday. I can still feel it giving that feeling that it's going to go out on me, not all the time. But I notice with stairs or stepping, or if I take my eyes of and I turn. I'm feeling more sensation in my thigh. \"     Objective:     Pain/Symptom Presentation: Patient denies any pain. Pain at rest: 0/10  Pain at worst: 0/10    Functional Strength: The patient demonstrates mild left weight shift with DL squat. Treatment:    Therapeutic Exercise: x 15min  Supine marching: 2 x 10 (B)   S/L hip ABD: x 20 (B) with cues for quad activation to keep knee straight  DL squat: 2 x 10   HEP update: Reviewed new HEP handout with new exercises and progressions, provided verbal and written instructions/cueing for proper technique and common errors/compensations as needed. Reviewed the importance of compliance with home exercise program to facilitate recovery and meet long-term goals.       Neuromuscular Re-education: x 25min  Quad set: 2 x 10 x 3\" with extensive cueing and instruction for activation   SAQ: x 20 (R)   SLR with quad set: 2 x 10 with extensive cueing  Hooklying PPT: 2 x 10 x 3\"   Gabriella 90-90 hold: 2 x 20\" (R)   LAQ: x 10 (R)   TKE into ball against wall: x 20 with cues for quad activation  Standing marching: 2 x 10 (B)   Tandem balance: x 20\" (B)   SLS: 2 x 20\" (B), cues to TKE on stance leg    Provider Interactions With Patient:   Added therapeutic exercises as documented, with cueing provided throughout performance to ensure correct technique during exercise. Provided patient with a written copy of exercise instruction which was also documented in patient's electronic medical chart. Assessment: Rachel Brady reports that she is allowed to drive as of the 28BQ of October. She is planning to move back home at that time and will transfer to the [facility] facility closer to her home. She was advised to call and schedule those appts as soon as she is able to ensure availability. The patient was taken through additional quad activation and strengthening exercises this date. Extensive cueing was provided to ensure proper muscle activation. The patient was issued an updated HEP handout for additional quad strengthening. We will continue to progress quad activation and strengthening exercises as tolerated. Goals:   Short-Term Goals:  Patient will demonstrate 20 SLR's without quad lag to facilitate proper quad activation and stabilization strength for lifting her leg in and out of the car. Timeframe: 2 weeks. The patient will improve quad strength and single-leg stance stability to demonstrate non-antalgic gait pattern with walking medium-length distances for household ambulation. Timeframe: 3-4 weeks. Long-Term Goals: The patient will improve LE strength and endurance to facilitate walking distances of 3 mile(s) daily for household and community ambulation. Timeframe: 6 weeks. Patient will improve lower motor control to perform double-leg squat with symmetrical loading, without asymmetries, and with proper posterior chain loading to facilitate squatting and lifting ADL's. Timeframe: 6 weeks. Patient will improve LE mobility, strength, and single-leg stabilization to meet strength requirements for reciprocal stair navigation pattern with no asymmetries for ascending and descending 5 flights of stairs daily for [household and community ambulation, community integration]. Timeframe: 6 weeks.     Plan: Follow up on tolerance to updated HEP. Continue to progress quad activation and strengthening. HEP: The patient was issued HEP 10/12/23 including quad set, SAQ, supine marching, hooklying PPT, SLR, TKE, and SLB. Charges:  Therex x 1, NMR x 2   Total Timed Treatment: 40min    Total Treatment Time: 40min

## 2023-10-16 ENCOUNTER — TELEPHONE (OUTPATIENT)
Dept: PHYSICAL THERAPY | Facility: HOSPITAL | Age: 48
End: 2023-10-16

## 2023-10-18 ENCOUNTER — OFFICE VISIT (OUTPATIENT)
Dept: PHYSICAL THERAPY | Age: 48
End: 2023-10-18
Attending: NURSE PRACTITIONER
Payer: COMMERCIAL

## 2023-10-18 PROCEDURE — 97110 THERAPEUTIC EXERCISES: CPT

## 2023-10-18 PROCEDURE — 97112 NEUROMUSCULAR REEDUCATION: CPT

## 2023-10-18 NOTE — PROGRESS NOTES
Date of Service: 10/18/2023  Dx: Femoral nerve palsy, right (G57.21)      DOI/S: 9/29/23        Insurance: [address] [place] [address] Limits: auth required     Visit #: 3  Authorized # of Visits: [id number]  POC/Auth Expiration: 12/31/23  Date of Last PN: 10/10/23 (Visit #1)  Authorizing Physician/Provider: Carlos Scott MD visit: N/A  Fall Risk: Slightly increased due to instability at knee         Precautions: None            Subjective: \"I'm good. The leg has been good. I'm starting to get more feeling back in the leg. I'm going home today. \" The patient reports that her confidence in building back up. \"I'm trying to go up and down the stairs, the way the knee should. I do think I'm compensating, sticking my hip out though. \" She reports that she is going to try driving this weekend. The patient reports good compliace and tolerance to her HEP. \"My time is getting better with my walks. \" The patient is usually walk 2-2.5 miles and is now able to complete her walk 2 minutes faster. Objective:     Pain/Symptom Presentation: Patient denies any pain, complaining of remaining numbness over the right anterior thigh. Pain at rest: 0/10  Pain at worst: 0/10    Palpation: Patient is able to perform isolated quad activation for a short period time, but is with difficulty sustaining the contraction. Functional Strength: Patient demonstrates 6-7deg quad lag with right active SLR. The patient demonstrates mild left weight shift with DL squat. Patient is with improved eccentric control and endurance for SAQ exercise compared to last session.      Treatment:    Therapeutic Exercise: x 10min  S/L hip ABD: x 20 (B) with cues for quad activation to keep knee straight  DL bridging: 2 x 10  DL squat: 2 x 10   Shuttle SLP: x 20 (R), 2 cords, cues for TKE     Neuromuscular Re-education: x 30min  Quad set: 2 x 10 x 3\" with tactile cues for quad activation and verbal cues to minimize glute activation/compensation  SAQ: x 20 (R) - improved active range, better eccentric control   SLR with quad set: 2 x 10, verbal cues for active knee extension control   Hooklying PPT: 2 x 10 x 3\"   Gabriella 90-90 hold: 2 x 20\" (R)   LAQ: 2 x 10 (R) cues for end-range knee extension   TKE into ball against wall: x 20, with tactile cues for quad activation and verbal cues to minimize glute activation/compensation  SLS: 2 x 20\" (B), cues to TKE on stance leg  Ankle wobble/balance board: A/P, M/L taps x 20, balance 2 x 20\" each    Provider Interactions With Patient:   Verbal and manual cueing on proper performance of the prescribed exercises. Assessment: Meliton Cui is planning on moving back home and to try over driving over the weekend. The patient was able to schedule therapy at our [facility] location which will be somewhat closer to her house once she moves home. The patient continues to have some difficulty with sustained quad activation as well as with active TKE control at her right leg. The patient shows improved exercise performance this session compared to last session, with improved active knee extension ROM, and improved endurance with quad strengthening exercises. She also reports improved walking gait speed. The patient was provided consistent verbal and tactile cues to facilitate quad activation with her exercises and to minimize compensations. The patient would benefit from continued therapy for further progression in quad activation and strength for tolerance to walking, squatting, and stairs. Goals:   Short-Term Goals:  Patient will demonstrate 20 SLR's without quad lag to facilitate proper quad activation and stabilization strength for lifting her leg in and out of the car. Timeframe: 2 weeks. The patient will improve quad strength and single-leg stance stability to demonstrate non-antalgic gait pattern with walking medium-length distances for household ambulation. Timeframe: 3-4 weeks. Long-Term Goals:   The patient will improve LE strength and endurance to facilitate walking distances of 3 mile(s) daily for household and community ambulation. Timeframe: 6 weeks. Patient will improve lower motor control to perform double-leg squat with symmetrical loading, without asymmetries, and with proper posterior chain loading to facilitate squatting and lifting ADL's. Timeframe: 6 weeks. Patient will improve LE mobility, strength, and single-leg stabilization to meet strength requirements for reciprocal stair navigation pattern with no asymmetries for ascending and descending 5 flights of stairs daily for [household and community ambulation, community integration]. Timeframe: 6 weeks. Plan: Continue to progress quad activation and strengthening. HEP: The patient was issued HEP 10/12/23 including quad set, SAQ, supine marching, hooklying PPT, SLR, TKE, and SLB. Charges:  Therex x 1, NMR x 2   Total Timed Treatment: 40min    Total Treatment Time: 40min

## 2023-10-19 ENCOUNTER — TELEPHONE (OUTPATIENT)
Dept: OBGYN CLINIC | Facility: CLINIC | Age: 48
End: 2023-10-19

## 2023-10-19 ENCOUNTER — APPOINTMENT (OUTPATIENT)
Dept: PHYSICAL THERAPY | Age: 48
End: 2023-10-19
Attending: HOSPITALIST
Payer: COMMERCIAL

## 2023-10-19 ENCOUNTER — TELEPHONE (OUTPATIENT)
Dept: FAMILY MEDICINE CLINIC | Facility: CLINIC | Age: 48
End: 2023-10-19

## 2023-10-19 NOTE — TELEPHONE ENCOUNTER
[facility]. Placed in Dr. Rosaline shafer in [place] for review.

## 2023-10-19 NOTE — TELEPHONE ENCOUNTER
Consult report dated 10/11/2023 was received via from [facility] Ac[facility].  Report placed in Dr Norah márquez pending review

## 2023-10-23 NOTE — TELEPHONE ENCOUNTER
Medical records reviewed. Patient is status post exploratory laparotomy, total abdominal hysterectomy, bilateral salpingo-oophorectomy on 9/29/2023 by gynecology oncology, Dr. Bruna Foster. Final pathology benign. History of SILVINA-1. Recommend repeat Pap smear in 6 months and if Pap is within normal limits then return to her OB/GYN for yearly visits.

## 2023-10-25 ENCOUNTER — OFFICE VISIT (OUTPATIENT)
Dept: PHYSICAL THERAPY | Age: 48
End: 2023-10-25
Attending: HOSPITALIST
Payer: COMMERCIAL

## 2023-10-25 DIAGNOSIS — G57.21 FEMORAL NERVE PALSY, RIGHT: Primary | ICD-10-CM

## 2023-10-25 PROCEDURE — 97110 THERAPEUTIC EXERCISES: CPT

## 2023-10-25 PROCEDURE — 97112 NEUROMUSCULAR REEDUCATION: CPT

## 2023-10-25 NOTE — PROGRESS NOTES
Date of Service: 10/18/2023  Diagnosis:   Femoral nerve palsy, right (G57.21)          Referring Provider: Judith Vicente  Date of Evaluation:    10/10/23    Precautions:  None Next MD visit:   none scheduled  Date of Surgery: n/a   Insurance Primary/Secondary: [employer] [place] / N/A     # Auth Visits: 12            Subjective: Pt states that the numbness isn't as bad as it was right after the surgery. I'm still having difficulty with stairs and getting in/out of my car. Pain: 0/10      Objective: See flowsheet for details. Assessment: Incorporated NMES to assist in quad activation during quad sets and SLRs with minimal muscle fatigue noted during SLRs. Added step ups onto 6\" step as stairs cont to be quite difficult for her. Quad fatigue was also noted during SL leg press, yet she was able to complete a reps. Will cont to progress LE strengthening as able. Goals:   Short-Term Goals:  Patient will demonstrate 20 SLR's without quad lag to facilitate proper quad activation and stabilization strength for lifting her leg in and out of the car. Timeframe: 2 weeks. The patient will improve quad strength and single-leg stance stability to demonstrate non-antalgic gait pattern with walking medium-length distances for household ambulation. Timeframe: 3-4 weeks. Long-Term Goals: The patient will improve LE strength and endurance to facilitate walking distances of 3 mile(s) daily for household and community ambulation. Timeframe: 6 weeks. Patient will improve lower motor control to perform double-leg squat with symmetrical loading, without asymmetries, and with proper posterior chain loading to facilitate squatting and lifting ADL's. Timeframe: 6 weeks.   Patient will improve LE mobility, strength, and single-leg stabilization to meet strength requirements for reciprocal stair navigation pattern with no asymmetries for ascending and descending 5 flights of stairs daily for Alexia-Jef and community ambulation, community integration]. Timeframe: 6 weeks. Plan: Will cont to focus on improving quad activation/strength. Date: 10/25/2023  TX#: 4/12 Date:                 TX#: 3/ Date:                 TX#: 4/ Date:                 TX#: 5/ Date: Tx#: 6/   Neuromuscular Re-ed:  10 mins  NMES to quad  Quad Sets 5 mins  SLRs 5 mins       Therapeutic Exercises:  30 mins       S/L Hip Abd  3x10       Prone Hip Ext  3x10       Step Ups 6\"  2x10       Lateral Step Ups 6\"  2x10       Shuttle Leg Press  DL 3x10 50#  SL 2x10 31#                Charges:    Therapeutic Ex x2  Neuromuscular Re-ed x1      Total Timed Treatment: 40 min    Total Treatment Time: 45 min

## 2023-10-27 ENCOUNTER — OFFICE VISIT (OUTPATIENT)
Dept: PHYSICAL THERAPY | Age: 48
End: 2023-10-27
Attending: HOSPITALIST
Payer: COMMERCIAL

## 2023-10-27 ENCOUNTER — APPOINTMENT (OUTPATIENT)
Dept: PHYSICAL THERAPY | Age: 48
End: 2023-10-27
Attending: NURSE PRACTITIONER
Payer: COMMERCIAL

## 2023-10-27 PROCEDURE — 97112 NEUROMUSCULAR REEDUCATION: CPT

## 2023-10-27 PROCEDURE — 97110 THERAPEUTIC EXERCISES: CPT

## 2023-10-27 NOTE — PROGRESS NOTES
Date of Service: 10/18/2023  Diagnosis:   Femoral nerve palsy, right (G57.21)          Referring Provider: Bayron Mcmillan  Date of Evaluation:    10/10/23    Precautions:  None Next MD visit:   none scheduled  Date of Surgery: n/a   Insurance Primary/Secondary: [employer] [place] / N/A     # Auth Visits: 12          Subjective: Pt states stairs have been easier since implementing NMES last visit and is only mildly difficult with walking compared to walking with RW prior to PT  Pain: 0/10 Numbness: 8-9/10     Objective: Gluteal firing compensation noted with SLR/QS but improving     Assessment: Patient demo improved carry over of quad recruitment with progression to perform QS and SLR without NMES this day, but with gluteal firing compensation noted. Implemented gentle STM of entire RLE for improved sensation and to reduce numbness. Reports of general inner thigh sensation returning, but remainder of leg mostly numb. Added anterior step down for continued quad control and functional strengthening     Goals:   Short-Term Goals:  Patient will demonstrate 20 SLR's without quad lag to facilitate proper quad activation and stabilization strength for lifting her leg in and out of the car. Timeframe: 2 weeks. The patient will improve quad strength and single-leg stance stability to demonstrate non-antalgic gait pattern with walking medium-length distances for household ambulation. Timeframe: 3-4 weeks. Long-Term Goals: The patient will improve LE strength and endurance to facilitate walking distances of 3 mile(s) daily for household and community ambulation. Timeframe: 6 weeks. Patient will improve lower motor control to perform double-leg squat with symmetrical loading, without asymmetries, and with proper posterior chain loading to facilitate squatting and lifting ADL's. Timeframe: 6 weeks.   Patient will improve LE mobility, strength, and single-leg stabilization to meet strength requirements for reciprocal stair navigation pattern with no asymmetries for ascending and descending 5 flights of stairs daily for Alexia-Jef and community ambulation, community integration]. Timeframe: 6 weeks. Plan: Will cont to focus on improving quad activation/strength. Date: 10/25/2023  TX#: 4/12 Date: 10/27/2023          TX#: 5/12 Date:                 TX#: 4/ Date:                 TX#: 5/ Date: Tx#: 6/   Neuromuscular Re-ed:  10 mins  NMES to quad  Quad Sets 5 mins  SLRs 5 mins Neuromuscular Re-ed: 8'  Quad Sets 2x10 3-5\" H  SLRs 2x10       Therapeutic Exercises:  30 mins Therapeutic Exercises:   30 mins       S/L Hip Abd  3x10 S/L Hip Abd  3x10      Prone Hip Ext  3x10 Prone Hip Ext  3x10      Step Ups 6\"  2x10 Step Ups 6\"  2x10      Lateral Step Ups 6\"  2x10 Lateral Step Up/Over 6\"  X10 ea Dir      Shuttle Leg Press  DL 3x10 50#  SL 2x10 31# Lateral Step Down x10 2\" Step  Anterior Step Down x10 2\" Step        Manual: 5'  Gentle STM for Incr Sensation         Charges:    Therapeutic Ex x2  Neuromuscular Re-ed x1      Total Timed Treatment: 40 min    Total Treatment Time: 40 min

## 2023-10-31 ENCOUNTER — OFFICE VISIT (OUTPATIENT)
Dept: PHYSICAL THERAPY | Age: 48
End: 2023-10-31
Attending: HOSPITALIST
Payer: COMMERCIAL

## 2023-10-31 ENCOUNTER — APPOINTMENT (OUTPATIENT)
Dept: PHYSICAL THERAPY | Age: 48
End: 2023-10-31
Attending: HOSPITALIST
Payer: COMMERCIAL

## 2023-10-31 PROCEDURE — 97110 THERAPEUTIC EXERCISES: CPT | Performed by: PHYSICAL THERAPIST

## 2023-10-31 NOTE — PROGRESS NOTES
Date of Service: 10/18/2023  Diagnosis:   Femoral nerve palsy, right (G57.21)          Referring Provider: Monnie Dancer  Date of Evaluation:    10/10/23    Precautions:  None Next MD visit:   none scheduled  Date of Surgery: n/a   Insurance Primary/Secondary: [employer]am[employer] HMO / N/A     # Auth Visits: 12          Subjective:     Pain: 0/10, reports numbness continues to improve. She is performing 50 squats/day         Objective:   No gait deviation,  Squat/posterior lunge - no deviation   LE ROM symmetrical      Assessment:   Patient tolerated all exercise well. No c/o give way. Primary complaint relates to sensation right LE. Discussed ADL concerns. 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 She has ambulation goal as there is no gait deviation. She has also achieved squat goal. Will evaluate stair next session. Goals:   Short-Term Goals:  Patient will demonstrate 20 SLR's without quad lag to facilitate proper quad activation and stabilization strength for lifting her leg in and out of the car. Timeframe: 2 weeks. The patient will improve quad strength and single-leg stance stability to demonstrate non-antalgic gait pattern with walking medium-length distances for household ambulation. Timeframe: 3-4 weeks. - Met   Long-Term Goals: The patient will improve LE strength and endurance to facilitate walking distances of 3 mile(s) daily for household and community ambulation. Timeframe: 6 weeks. Patient will improve lower motor control to perform double-leg squat with symmetrical loading, without asymmetries, and with proper posterior chain loading to facilitate squatting and lifting ADL's. Timeframe: 6 weeks.  - Met   Patient will improve LE mobility, strength, and single-leg stabilization to meet strength requirements for reciprocal stair navigation pattern with no asymmetries for ascending and descending 5 flights of stairs daily for [household and community ambulation, community integration]. Timeframe: 6 weeks. Plan: Will cont to focus on improving quad activation/strength. Date: 10/25/2023  TX#: 4/12 Date: 10/27/2023          TX#: 5/12 Date:   10/31/2023              TX#: 4/12 Date:                 TX#: 5/ Date: Tx#: 6/   Neuromuscular Re-ed:  10 mins  NMES to quad  Quad Sets 5 mins  SLRs 5 mins Neuromuscular Re-ed: 8'  Quad Sets 2x10 3-5\" H  SLRs 2x10  Therapeutic Ex  Bike L4 8 minutes      Therapeutic Exercises:  30 mins Therapeutic Exercises:   30 mins  TRX squats > 15 reps      S/L Hip Abd  3x10 S/L Hip Abd  3x10 TRX posterior lunge 10 reps each      Prone Hip Ext  3x10 Prone Hip Ext  3x10 Shuttle 5 bands squats > 20 reps     Step Ups 6\"  2x10 Step Ups 6\"  2x10 Shuttle 4 bands single leg squats > 20 reps each     Lateral Step Ups 6\"  2x10 Lateral Step Up/Over 6\"  X10 ea Dir SL quad stretch 30 seconds x 4 each      Shuttle Leg Press  DL 3x10 50#  SL 2x10 31# Lateral Step Down x10 2\" Step  Anterior Step Down x10 2\" Step  Single leg balance right and left LE       Manual: 5'  Gentle STM for Incr Sensation  Long sitting forward bending bilat reach ant, right and left lateral        Charges:    Therapeutic Ex x 3   Total Timed Treatment: 45 min    Total Treatment Time: 45  min

## 2023-11-01 ENCOUNTER — APPOINTMENT (OUTPATIENT)
Dept: PHYSICAL THERAPY | Age: 48
End: 2023-11-01
Attending: NURSE PRACTITIONER
Payer: COMMERCIAL

## 2023-11-02 ENCOUNTER — OFFICE VISIT (OUTPATIENT)
Dept: PHYSICAL THERAPY | Age: 48
End: 2023-11-02
Attending: HOSPITALIST
Payer: COMMERCIAL

## 2023-11-02 PROCEDURE — 97110 THERAPEUTIC EXERCISES: CPT | Performed by: PHYSICAL THERAPIST

## 2023-11-02 NOTE — PROGRESS NOTES
Date of Service: 10/18/2023  Diagnosis:   Femoral nerve palsy, right (G57.21)          Referring Provider: Judith Vicente  Date of Evaluation:    10/10/23    Precautions:  None Next MD visit:   none scheduled  Date of Surgery: n/a   Insurance Primary/Secondary: [address] [place], [place] [address]O / N/A     # Auth Visits: 12          Subjective:   Patient states she walked 3.5  miles today   Pain: 0/10, reports numbness continues to improve. Objective:   No gait deviation. HEP addition as noted         Assessment:   No instability with step up/down and balance work on [employer] and airex pads. Patient has achieved nearly all goals as established at initial visit. Recommend assessment of LE strength/control on higher step as she prepares to return to work as a . Based on current progress, it does not appear she will be in need of all 12 therapy visits as approved. Goals:   Short-Term Goals:  Patient will demonstrate 20 SLR's without quad lag to facilitate proper quad activation and stabilization strength for lifting her leg in and out of the car. Timeframe: 2 weeks. The patient will improve quad strength and single-leg stance stability to demonstrate non-antalgic gait pattern with walking medium-length distances for household ambulation. Timeframe: 3-4 weeks. - Met   Long-Term Goals: The patient will improve LE strength and endurance to facilitate walking distances of 3 mile(s) daily for household and community ambulation. Timeframe: 6 weeks. Patient will improve lower motor control to perform double-leg squat with symmetrical loading, without asymmetries, and with proper posterior chain loading to facilitate squatting and lifting ADL's. Timeframe: 6 weeks.  - Met   Patient will improve LE mobility, strength, and single-leg stabilization to meet strength requirements for reciprocal stair navigation pattern with no asymmetries for ascending and descending 5 flights of stairs daily for [household and community ambulation, community integration]. Timeframe: 6 weeks. Met     Plan: Will cont to focus on improving quad activation/strength. Date: 10/25/2023  TX#: 4/12 Date: 10/27/2023          TX#: 5/12 Date:   10/31/2023              TX#: 4/12 Date:    11/2/2023              TX#: 5/12 Date: Tx#: 6/   Neuromuscular Re-ed:  10 mins  NMES to quad  Quad Sets 5 mins  SLRs 5 mins Neuromuscular Re-ed: 8'  Quad Sets 2x10 3-5\" H  SLRs 2x10  Therapeutic Ex  Bike L4 8 minutes  Bike L5 8 minutes     Therapeutic Exercises:  30 mins Therapeutic Exercises:   30 mins  TRX squats > 15 reps  TRX 20 reps squats    S/L Hip Abd  3x10 S/L Hip Abd  3x10 TRX posterior lunge 10 reps each  TRX 20 reps each  posterior lunge     Prone Hip Ext  3x10 Prone Hip Ext  3x10 Shuttle 5 bands squats > 20 reps Step up/back and lateral step up/over  on Bosu 1 minute each  UE support prn     Step Ups 6\"  2x10 Step Ups 6\"  2x10 Shuttle 4 bands single leg squats > 20 reps each Isometric hold lunge position same side reach with SB 10 reps x 2 each     Lateral Step Ups 6\"  2x10 Lateral Step Up/Over 6\"  X10 ea Dir SL quad stretch 30 seconds x 4 each  Lateral step blue band at ankles (HEP)    Shuttle Leg Press  DL 3x10 50#  SL 2x10 31# Lateral Step Down x10 2\" Step  Anterior Step Down x10 2\" Step  Single leg balance right and left LE  Ankle mobility tandem stand 15 reps each      Manual: 5'  Gentle STM for Incr Sensation  Long sitting forward bending bilat reach ant, right and left lateral  Single leg balance with UE reaches 20 seconds x 5 each       Charges:    Therapeutic Ex x 3   Total Timed Treatment: 40 min    Total Treatment Time: 40  min

## 2023-11-03 ENCOUNTER — APPOINTMENT (OUTPATIENT)
Dept: PHYSICAL THERAPY | Age: 48
End: 2023-11-03
Attending: HOSPITALIST
Payer: COMMERCIAL

## 2023-11-06 ENCOUNTER — APPOINTMENT (OUTPATIENT)
Dept: PHYSICAL THERAPY | Age: 48
End: 2023-11-06
Attending: NURSE PRACTITIONER
Payer: COMMERCIAL

## 2023-11-07 ENCOUNTER — APPOINTMENT (OUTPATIENT)
Dept: PHYSICAL THERAPY | Age: 48
End: 2023-11-07
Attending: HOSPITALIST
Payer: COMMERCIAL

## 2023-11-07 ENCOUNTER — TELEPHONE (OUTPATIENT)
Dept: PHYSICAL THERAPY | Age: 48
End: 2023-11-07

## 2023-11-07 NOTE — TELEPHONE ENCOUNTER
Spoke with MsEvangelina Zenobia Willyash regarding appointment 11/7 @ 2:45. Ms. Zenobia Patel indicated she had cancelled appointment via my chart as she due to scheduling conflict (called into work).

## 2023-11-08 ENCOUNTER — APPOINTMENT (OUTPATIENT)
Dept: PHYSICAL THERAPY | Age: 48
End: 2023-11-08
Attending: NURSE PRACTITIONER
Payer: COMMERCIAL

## 2023-11-09 ENCOUNTER — OFFICE VISIT (OUTPATIENT)
Dept: PHYSICAL THERAPY | Age: 48
End: 2023-11-09
Attending: HOSPITALIST
Payer: COMMERCIAL

## 2023-11-09 PROCEDURE — 97110 THERAPEUTIC EXERCISES: CPT

## 2023-11-09 NOTE — PROGRESS NOTES
Date of Service: 10/18/2023  Diagnosis:   Femoral nerve palsy, right (G57.21)          Referring Provider: Meliton Almonte  Date of Evaluation:    10/10/23    Precautions:  None Next MD visit:   none scheduled  Date of Surgery: n/a   Insurance Primary/Secondary: Brandan Langley O / N/A     # Auth Visits: 12          Subjective:  Tito Lee reports history of hysterectomy with \"stretch\" to femoral nerve with right LE weakness and right paraesthesias. She reports improving function. She reports with going up/down stairs back to normal level. She reports no longer feeling giving out sensation. Prior level function - would do a run/daily 2-4 miles daily, regular exercise, hiking, indoor climbing. Resuming work 11/29/2023. Works as . Indicates concerned about challenges with getting of floor, improving but continuing paraesthesias with thigh and knee region, sensitivity to kneeling, fear of leg giving way. Pain: 0/10, reports numbness continues to improve. Objective:   -Gait appears without deviation  -Fatigue with high step up on right verse left  -Prone knee bend (+) R compared L    Assessment:   Report of functional improvement; improving leg strength per functional assessment, mildly labored high step-up right verse left. Goals:   Short-Term Goals:  Patient will demonstrate 20 SLR's without quad lag to facilitate proper quad activation and stabilization strength for lifting her leg in and out of the car. Timeframe: 2 weeks. The patient will improve quad strength and single-leg stance stability to demonstrate non-antalgic gait pattern with walking medium-length distances for household ambulation. Timeframe: 3-4 weeks. - Met   Long-Term Goals: The patient will improve LE strength and endurance to facilitate walking distances of 3 mile(s) daily for household and community ambulation. Timeframe: 6 weeks.   Patient will improve lower motor control to perform double-leg squat with symmetrical loading, without asymmetries, and with proper posterior chain loading to facilitate squatting and lifting ADL's. Timeframe: 6 weeks. - Met   Patient will improve LE mobility, strength, and single-leg stabilization to meet strength requirements for reciprocal stair navigation pattern with no asymmetries for ascending and descending 5 flights of stairs daily for [household and community ambulation, community integration]. Timeframe: 6 weeks. Met     Plan: Will cont to focus on improving quad activation/strength.   Date: 10/25/2023  TX#: 4/12 Date: 10/27/2023          TX#: 5/12 Date:   10/31/2023              TX#: 4/12 Date:    11/2/2023              TX#: 5/12 Date: 11/09/2023  Tx#: 6/12   Neuromuscular Re-ed:  10 mins  NMES to quad  Quad Sets 5 mins  SLRs 5 mins Neuromuscular Re-ed: 8'  Quad Sets 2x10 3-5\" H  SLRs 2x10  Therapeutic Ex  Bike L4 8 minutes  Bike L5 8 minutes  Therapeutic excercise x 40 minutes    -Stationary bike 15 minutes @ level 5.0 resistance @ 60-70 rpm   Therapeutic Exercises:  30 mins Therapeutic Exercises:   30 mins  TRX squats > 15 reps  TRX 20 reps squats _________________   S/L Hip Abd  3x10 S/L Hip Abd  3x10 TRX posterior lunge 10 reps each  TRX 20 reps each  posterior lunge  -Stationary backward lunge x 15 reps x 3 sets   Prone Hip Ext  3x10 Prone Hip Ext  3x10 Shuttle 5 bands squats > 20 reps Step up/back and lateral step up/over  on Bosu 1 minute each  UE support prn  -Shuttle SL press level 6 (R) 15 reps x 3 sets; (Level 8.0 left)   Step Ups 6\"  2x10 Step Ups 6\"  2x10 Shuttle 4 bands single leg squats > 20 reps each Isometric hold lunge position same side reach with SB 10 reps x 2 each  -Prone knee flexion (femoral nerve mobilization x 20 reps x 2 sets  -Side lying femoral nerve mobilization (Did hip extension from flexion with knee flexion)   Lateral Step Ups 6\"  2x10 Lateral Step Up/Over 6\"  X10 ea Dir SL quad stretch 30 seconds x 4 each  Lateral step blue band at ankles (HEP)    Shuttle Leg Press  DL 3x10 50#  SL 2x10 31# Lateral Step Down x10 2\" Step  Anterior Step Down x10 2\" Step  Single leg balance right and left LE  Ankle mobility tandem stand 15 reps each      Manual: 5'  Gentle STM for Incr Sensation  Long sitting forward bending bilat reach ant, right and left lateral  Single leg balance with UE reaches 20 seconds x 5 each       Charges:    Therapeutic Ex x 3   Total Timed Treatment: 40 min    Total Treatment Time: 40  min

## 2023-11-10 ENCOUNTER — APPOINTMENT (OUTPATIENT)
Dept: PHYSICAL THERAPY | Age: 48
End: 2023-11-10
Attending: HOSPITALIST
Payer: COMMERCIAL

## 2023-11-13 ENCOUNTER — APPOINTMENT (OUTPATIENT)
Dept: PHYSICAL THERAPY | Age: 48
End: 2023-11-13
Attending: HOSPITALIST
Payer: COMMERCIAL

## 2023-11-13 ENCOUNTER — APPOINTMENT (OUTPATIENT)
Dept: PHYSICAL THERAPY | Age: 48
End: 2023-11-13
Attending: NURSE PRACTITIONER
Payer: COMMERCIAL

## 2023-11-15 ENCOUNTER — APPOINTMENT (OUTPATIENT)
Dept: PHYSICAL THERAPY | Age: 48
End: 2023-11-15
Attending: NURSE PRACTITIONER
Payer: COMMERCIAL

## 2023-11-15 ENCOUNTER — APPOINTMENT (OUTPATIENT)
Dept: PHYSICAL THERAPY | Age: 48
End: 2023-11-15
Attending: HOSPITALIST
Payer: COMMERCIAL

## 2023-11-16 ENCOUNTER — APPOINTMENT (OUTPATIENT)
Dept: PHYSICAL THERAPY | Age: 48
End: 2023-11-16
Attending: HOSPITALIST
Payer: COMMERCIAL

## 2023-11-17 ENCOUNTER — LAB ENCOUNTER (OUTPATIENT)
Dept: LAB | Age: 48
End: 2023-11-17
Attending: OBSTETRICS & GYNECOLOGY
Payer: COMMERCIAL

## 2023-11-17 DIAGNOSIS — D64.9 ANEMIA, UNSPECIFIED: Primary | ICD-10-CM

## 2023-11-17 LAB
BASOPHILS # BLD AUTO: 0.03 X10(3) UL (ref 0–0.2)
BASOPHILS NFR BLD AUTO: 0.5 %
EOSINOPHIL # BLD AUTO: 0.16 X10(3) UL (ref 0–0.7)
EOSINOPHIL NFR BLD AUTO: 2.6 %
ERYTHROCYTE [DISTWIDTH] IN BLOOD BY AUTOMATED COUNT: 18 %
HCT VFR BLD AUTO: 30.8 %
HGB BLD-MCNC: 8.9 G/DL
IMM GRANULOCYTES # BLD AUTO: 0.03 X10(3) UL (ref 0–1)
IMM GRANULOCYTES NFR BLD: 0.5 %
LYMPHOCYTES # BLD AUTO: 1.61 X10(3) UL (ref 1–4)
LYMPHOCYTES NFR BLD AUTO: 25.8 %
MCH RBC QN AUTO: 20.1 PG (ref 26–34)
MCHC RBC AUTO-ENTMCNC: 28.9 G/DL (ref 31–37)
MCV RBC AUTO: 69.5 FL
MONOCYTES # BLD AUTO: 0.49 X10(3) UL (ref 0.1–1)
MONOCYTES NFR BLD AUTO: 7.8 %
NEUTROPHILS # BLD AUTO: 3.93 X10 (3) UL (ref 1.5–7.7)
NEUTROPHILS # BLD AUTO: 3.93 X10(3) UL (ref 1.5–7.7)
NEUTROPHILS NFR BLD AUTO: 62.8 %
PLATELET # BLD AUTO: 452 10(3)UL (ref 150–450)
RBC # BLD AUTO: 4.43 X10(6)UL
WBC # BLD AUTO: 6.3 X10(3) UL (ref 4–11)

## 2023-11-17 PROCEDURE — 85025 COMPLETE CBC W/AUTO DIFF WBC: CPT

## 2023-11-17 PROCEDURE — 36415 COLL VENOUS BLD VENIPUNCTURE: CPT

## 2023-11-20 ENCOUNTER — APPOINTMENT (OUTPATIENT)
Dept: PHYSICAL THERAPY | Age: 48
End: 2023-11-20
Attending: NURSE PRACTITIONER
Payer: COMMERCIAL

## 2023-11-20 ENCOUNTER — APPOINTMENT (OUTPATIENT)
Dept: PHYSICAL THERAPY | Age: 48
End: 2023-11-20
Attending: HOSPITALIST
Payer: COMMERCIAL

## 2023-11-20 ENCOUNTER — OFFICE VISIT (OUTPATIENT)
Dept: PHYSICAL THERAPY | Age: 48
End: 2023-11-20
Attending: HOSPITALIST
Payer: COMMERCIAL

## 2023-11-20 PROCEDURE — 97110 THERAPEUTIC EXERCISES: CPT | Performed by: PHYSICAL THERAPIST

## 2023-11-20 NOTE — PROGRESS NOTES
Date of Service: 10/18/2023  Diagnosis:   Femoral nerve palsy, right (G57.21)          Referring Provider: Vicki Harley  Date of Evaluation:    10/10/23    Precautions:  None Next MD visit:   none scheduled  Date of Surgery: n/a   Insurance Primary/Secondary: [address] [place], [place] [address]O / N/A     # Auth Visits: 12              Subjective:    Patient states she is ready for discharge. She has resumed running. .  Pain: 0/10, reports numbness continues to improve. Objective:   Gait: No deviation   Strength: Bilateral LE strength 5/5. Demonstrates squat and lunge - symmetrica loading   ROM: Bilateral LE ROM WNL. There is bilat gastroc/soleus tightness      Assessment:   Patient has achieved all therapy goals as established at initial visit. She has resumed all ADLS and recreational activities with no  limitations. She is ready for discharge and is appreciative of the assistance in her care. Goals:   Short-Term Goals:  Patient will demonstrate 20 SLR's without quad lag to facilitate proper quad activation and stabilization strength for lifting her leg in and out of the car. Timeframe: 2 weeks. - Met  The patient will improve quad strength and single-leg stance stability to demonstrate non-antalgic gait pattern with walking medium-length distances for household ambulation. Timeframe: 3-4 weeks. - Met   Long-Term Goals: The patient will improve LE strength and endurance to facilitate walking distances of 3 mile(s) daily for household and community ambulation. Timeframe: 6 weeks. - Met  Patient will improve lower motor control to perform double-leg squat with symmetrical loading, without asymmetries, and with proper posterior chain loading to facilitate squatting and lifting ADL's. Timeframe: 6 weeks.  - Met   Patient will improve LE mobility, strength, and single-leg stabilization to meet strength requirements for reciprocal stair navigation pattern with no asymmetries for ascending and descending 5 flights of stairs daily for [household and community ambulation, community integration]. Timeframe: 6 weeks. Met         Plan: Discontinue PT     Patient/Family/Caregiver was advised of these findings, precautions, and treatment options and has agreed to actively participate in planning and for this course of care. Thank you for your referral. If you have any questions, please contact me at Dept: [phone]. Sincerely,  Electronically signed by therapist: Cortes Mejía PT     Physician's certification required:  No  Please co-sign or sign and return this letter via fax as soon as possible to [phone]. I certify the need for these services furnished under this plan of treatment and while under my care.     X___________________________________________________ Date____________________    Certification From: 80/22/4226  To:2/18/2024    Date: 10/25/2023  TX#: 4/12 Date: 10/27/2023          TX#: 5/12 Date:   10/31/2023              TX#: 4/12 Date:    11/2/2023              TX#: 5/12 Date: 11/09/2023  Tx#: 6/12 Date: 11/20/2023   Tx#: 7/12    Neuromuscular Re-ed:  10 mins  NMES to quad  Quad Sets 5 mins  SLRs 5 mins Neuromuscular Re-ed: 8'  Quad Sets 2x10 3-5\" H  SLRs 2x10  Therapeutic Ex  Bike L4 8 minutes  Bike L5 8 minutes  Therapeutic excercise x 40 minutes    -Stationary bike 15 minutes @ level 5.0 resistance @ 60-70 rpm Elliptical 8 minutes    Therapeutic Exercises:  30 mins Therapeutic Exercises:   30 mins  TRX squats > 15 reps  TRX 20 reps squats _________________ Shuttle 5 bands squats 20 reps   S/L Hip Abd  3x10 S/L Hip Abd  3x10 TRX posterior lunge 10 reps each  TRX 20 reps each  posterior lunge  -Stationary backward lunge x 15 reps x 3 sets Shuttle 4  bands single leg squats   Right = 50   Left = 50    Prone Hip Ext  3x10 Prone Hip Ext  3x10 Shuttle 5 bands squats > 20 reps Step up/back and lateral step up/over  on Bosu 1 minute each  UE support prn  -Shuttle SL press level 6 (R) 15 reps x 3 sets; (Level 8.0 left) TRX squats parallel and past parallel - no pain   Step Ups 6\"  2x10 Step Ups 6\"  2x10 Shuttle 4 bands single leg squats > 20 reps each Isometric hold lunge position same side reach with SB 10 reps x 2 each  -Prone knee flexion (femoral nerve mobilization x 20 reps x 2 sets  -Side lying femoral nerve mobilization (Did hip extension from flexion with knee flexion) Ankle mobility, instruction in gastroc/soleus stretch    Lateral Step Ups 6\"  2x10 Lateral Step Up/Over 6\"  X10 ea Dir SL quad stretch 30 seconds x 4 each  Lateral step blue band at ankles (HEP)  Quad stretch HEP    Shuttle Leg Press  DL 3x10 50#  SL 2x10 31# Lateral Step Down x10 2\" Step  Anterior Step Down x10 2\" Step  Single leg balance right and left LE  Ankle mobility tandem stand 15 reps each   Quadruped rock with spinal flexion and hip hinge     Manual: 5'  Gentle STM for Incr Sensation  Long sitting forward bending bilat reach ant, right and left lateral  Single leg balance with UE reaches 20 seconds x 5 each        Charges:    Therapeutic Ex x 3   Total Timed Treatment: 40 min    Total Treatment Time: 40  min

## 2023-11-22 ENCOUNTER — APPOINTMENT (OUTPATIENT)
Dept: PHYSICAL THERAPY | Age: 48
End: 2023-11-22
Attending: HOSPITALIST
Payer: COMMERCIAL

## 2023-11-22 ENCOUNTER — APPOINTMENT (OUTPATIENT)
Dept: PHYSICAL THERAPY | Age: 48
End: 2023-11-22
Attending: NURSE PRACTITIONER
Payer: COMMERCIAL

## 2023-11-27 ENCOUNTER — TELEPHONE (OUTPATIENT)
Dept: OBGYN CLINIC | Facility: CLINIC | Age: 48
End: 2023-11-27

## 2023-11-27 ENCOUNTER — APPOINTMENT (OUTPATIENT)
Dept: PHYSICAL THERAPY | Age: 48
End: 2023-11-27
Attending: HOSPITALIST
Payer: COMMERCIAL

## 2023-11-27 ENCOUNTER — TELEPHONE (OUTPATIENT)
Dept: FAMILY MEDICINE CLINIC | Facility: CLINIC | Age: 48
End: 2023-11-27

## 2023-11-27 ENCOUNTER — APPOINTMENT (OUTPATIENT)
Dept: PHYSICAL THERAPY | Age: 48
End: 2023-11-27
Attending: NURSE PRACTITIONER
Payer: COMMERCIAL

## 2023-11-27 NOTE — TELEPHONE ENCOUNTER
Medical records reviewed. Patient seen by gynecology oncology, Dr. Vanna Bassett. Patient is status post exploratory laparotomy, total abdominal hysterectomy, bilateral salpingo-oophorectomy on 9/29/2023 with benign pathology. History of ASCUS, HPV positive. Plan to repeat Pap postop in 3 months. Pathology SILVINA-1. Patient may return to benign OB/GYN for yearly visits pending 3-month follow-up.     Kelsey Miller MD   [facility] - OBGYN

## 2023-11-27 NOTE — TELEPHONE ENCOUNTER
Gynecologic consult report dated 11/22/2023 was received via fax form [id number]. Report was placed in Dr Bonita Epps in-box pending review.

## 2023-11-29 ENCOUNTER — APPOINTMENT (OUTPATIENT)
Dept: PHYSICAL THERAPY | Age: 48
End: 2023-11-29
Attending: NURSE PRACTITIONER
Payer: COMMERCIAL

## 2023-11-29 ENCOUNTER — APPOINTMENT (OUTPATIENT)
Dept: PHYSICAL THERAPY | Age: 48
End: 2023-11-29
Attending: HOSPITALIST
Payer: COMMERCIAL

## 2024-02-05 ENCOUNTER — OFFICE VISIT (OUTPATIENT)
Dept: FAMILY MEDICINE CLINIC | Facility: CLINIC | Age: 49
End: 2024-02-05
Payer: COMMERCIAL

## 2024-02-05 VITALS
SYSTOLIC BLOOD PRESSURE: 110 MMHG | OXYGEN SATURATION: 98 % | RESPIRATION RATE: 16 BRPM | BODY MASS INDEX: 25.16 KG/M2 | WEIGHT: 151 LBS | HEART RATE: 84 BPM | DIASTOLIC BLOOD PRESSURE: 70 MMHG | HEIGHT: 65 IN

## 2024-02-05 DIAGNOSIS — M79.601 PARESTHESIA AND PAIN OF BOTH UPPER EXTREMITIES: ICD-10-CM

## 2024-02-05 DIAGNOSIS — R20.2 PARESTHESIA AND PAIN OF BOTH UPPER EXTREMITIES: ICD-10-CM

## 2024-02-05 DIAGNOSIS — G56.03 BILATERAL CARPAL TUNNEL SYNDROME: Primary | ICD-10-CM

## 2024-02-05 DIAGNOSIS — M79.602 PARESTHESIA AND PAIN OF BOTH UPPER EXTREMITIES: ICD-10-CM

## 2024-02-05 PROCEDURE — 99214 OFFICE O/P EST MOD 30 MIN: CPT | Performed by: NURSE PRACTITIONER

## 2024-02-05 PROCEDURE — 3078F DIAST BP <80 MM HG: CPT | Performed by: NURSE PRACTITIONER

## 2024-02-05 PROCEDURE — 3074F SYST BP LT 130 MM HG: CPT | Performed by: NURSE PRACTITIONER

## 2024-02-05 PROCEDURE — 3008F BODY MASS INDEX DOCD: CPT | Performed by: NURSE PRACTITIONER

## 2024-02-05 RX ORDER — MELOXICAM 15 MG/1
15 TABLET ORAL DAILY
Qty: 30 TABLET | Refills: 0 | Status: SHIPPED | OUTPATIENT
Start: 2024-02-05 | End: 2024-03-06

## 2024-02-05 NOTE — PROGRESS NOTES
Chief Complaint   Patient presents with    Hand Pain     C/o bilateral hand swelling and numbness in fingers since          The patient complains of the pain of L and R  wrist. Has had it for 3-4 months . Has tingling and pain in the fingers of affected hands. The pain wakes the patient up at night. Has not had this treated in the past.    Current Outpatient Medications   Medication Sig Dispense Refill    NON FORMULARY Take 2 tablets by mouth every morning. Homeopathic hormonal supplement        Past Medical History:   Diagnosis Date    Abnormal Pap smear of cervix     Calculus of kidney     Migraines       Past Surgical History:   Procedure Laterality Date    BENIGN BIOPSY LEFT  2015    BENIGN BIOPSY LEFT  2015    BENIGN BIOPSY LEFT  2016    BENIGN BIOPSY LEFT  2016    BENIGN BIOPSY LEFT  2016    BREAST BIOPSY      x2    COLPOSCOPY,BX CERVIX/ENDOCERV CURR  1997    nl    EXCISIONAL BIOPSY LEFT      age 16    TOTAL ABDOMINAL HYSTERECTOMY N/A 2023    Procedure: ABDOMINAL HYSTERECTOMY;  Surgeon: Manuel Lewis DO;  Location: [facility] MAIN OR      Family History   Problem Relation Age of Onset    Obesity Father     Heart Disorder Father         CHF    Diabetes Mother     Lipids Mother     Other (Other) Maternal Grandmother         DEMENTIA    Diabetes Maternal Grandfather     Other (Other) Paternal Grandmother         DEMENTIA    Heart Disorder Paternal Grandfather         AAA    Lipids Daughter       Social History:  Social History     Socioeconomic History    Marital status:    Tobacco Use    Smoking status: Former     Packs/day: 0     Types: Cigarettes     Quit date:      Years since quittin.    Smokeless tobacco: Never   Vaping Use    Vaping Use: Never used   Substance and Sexual Activity    Alcohol use: Not Currently     Alcohol/week: 0.0 standard drinks of alcohol    Drug use: No    Sexual activity: Yes     Partners: Male   Other Topics Concern    Caffeine Concern Yes    Exercise  Yes           ROS:  GEN: no fever, no chills, no fatigue  CHEST: no chest pains.  SKIN: no rashes  HEM: no ecchymoses  JOINTS: no other joints pain.  NEURO: no tingling, no weakness, no abnormal sensation.      /70   Pulse 84   Resp 16   Ht 5' 5\" (1.651 m)   Wt 151 lb (68.5 kg)   LMP 09/04/2023 (Exact Date)   SpO2 98%   BMI 25.13 kg/m²     PE:  Gen:  WD/WN NAD  HEENT:  PEERLA, EOM-i.  LUNGS:CTA brenton.  HEART:S1/S2 reg., no murmurs, clicks, gallops  SKIN:no rashes on the chest or back.  Back:  Normal on inspection, no pain on palpation of the spinal and paraspinal muscles.   Neuro:  Reflexes at knees 2+ and symmetric  L WRIST: no snuff box tenderness, no pain over capitate, lunate, or hook of hamate. No pain with ulnar or radial deviation, flexion or extension. FROM.Pain on the maximum flexion. Positive Tinnel and Phalen's  signs positive.    A/P  Diagnoses and all orders for this visit:    Bilateral carpal tunnel syndrome  -     CBC With Differential With Platelet; Future  -     Comp Metabolic Panel (14) [E]; Future  -     EMG (at [place])  -     Meloxicam 15 MG Oral Tab; Take 1 tablet (15 mg total) by mouth daily.    Paresthesia and pain of both upper extremities  -     XR CERVICAL SPINE (4VIEWS) (CPT=39693); Future     Stable   Wrist splints   F/u for worsening symptoms

## 2024-02-08 ENCOUNTER — ORDER TRANSCRIPTION (OUTPATIENT)
Dept: ADMINISTRATIVE | Facility: HOSPITAL | Age: 49
End: 2024-02-08

## 2024-02-08 DIAGNOSIS — G56.00 CARPAL TUNNEL SYNDROME: Primary | ICD-10-CM

## 2024-02-19 ENCOUNTER — LAB ENCOUNTER (OUTPATIENT)
Dept: LAB | Age: 49
End: 2024-02-19
Attending: NURSE PRACTITIONER
Payer: COMMERCIAL

## 2024-02-19 DIAGNOSIS — D50.9 IRON DEFICIENCY ANEMIA, UNSPECIFIED IRON DEFICIENCY ANEMIA TYPE: ICD-10-CM

## 2024-02-19 DIAGNOSIS — G56.03 BILATERAL CARPAL TUNNEL SYNDROME: ICD-10-CM

## 2024-02-19 LAB
ALBUMIN SERPL-MCNC: 4 G/DL (ref 3.4–5)
ALBUMIN/GLOB SERPL: 1.1 {RATIO} (ref 1–2)
ALP LIVER SERPL-CCNC: 96 U/L
ALT SERPL-CCNC: 25 U/L
ANION GAP SERPL CALC-SCNC: 1 MMOL/L (ref 0–18)
AST SERPL-CCNC: 15 U/L (ref 15–37)
BASOPHILS # BLD AUTO: 0.02 X10(3) UL (ref 0–0.2)
BASOPHILS NFR BLD AUTO: 0.4 %
BILIRUB SERPL-MCNC: 0.5 MG/DL (ref 0.1–2)
BUN BLD-MCNC: 13 MG/DL (ref 9–23)
CALCIUM BLD-MCNC: 10.7 MG/DL (ref 8.5–10.1)
CHLORIDE SERPL-SCNC: 109 MMOL/L (ref 98–112)
CO2 SERPL-SCNC: 29 MMOL/L (ref 21–32)
CREAT BLD-MCNC: 0.79 MG/DL
EGFRCR SERPLBLD CKD-EPI 2021: 91 ML/MIN/1.73M2 (ref 60–?)
EOSINOPHIL # BLD AUTO: 0.15 X10(3) UL (ref 0–0.7)
EOSINOPHIL NFR BLD AUTO: 2.6 %
ERYTHROCYTE [DISTWIDTH] IN BLOOD BY AUTOMATED COUNT: 23.9 %
FASTING STATUS PATIENT QL REPORTED: YES
GLOBULIN PLAS-MCNC: 3.6 G/DL (ref 2.8–4.4)
GLUCOSE BLD-MCNC: 106 MG/DL (ref 70–99)
HCT VFR BLD AUTO: 39.1 %
HGB BLD-MCNC: 11.9 G/DL
IMM GRANULOCYTES # BLD AUTO: 0.01 X10(3) UL (ref 0–1)
IMM GRANULOCYTES NFR BLD: 0.2 %
LYMPHOCYTES # BLD AUTO: 1.75 X10(3) UL (ref 1–4)
LYMPHOCYTES NFR BLD AUTO: 30.8 %
MCH RBC QN AUTO: 23.7 PG (ref 26–34)
MCHC RBC AUTO-ENTMCNC: 30.4 G/DL (ref 31–37)
MCV RBC AUTO: 77.7 FL
MONOCYTES # BLD AUTO: 0.44 X10(3) UL (ref 0.1–1)
MONOCYTES NFR BLD AUTO: 7.7 %
NEUTROPHILS # BLD AUTO: 3.31 X10 (3) UL (ref 1.5–7.7)
NEUTROPHILS # BLD AUTO: 3.31 X10(3) UL (ref 1.5–7.7)
NEUTROPHILS NFR BLD AUTO: 58.3 %
OSMOLALITY SERPL CALC.SUM OF ELEC: 289 MOSM/KG (ref 275–295)
PLATELET # BLD AUTO: 361 10(3)UL (ref 150–450)
PLATELET MORPHOLOGY: NORMAL
POTASSIUM SERPL-SCNC: 4.7 MMOL/L (ref 3.5–5.1)
PROT SERPL-MCNC: 7.6 G/DL (ref 6.4–8.2)
RBC # BLD AUTO: 5.03 X10(6)UL
SODIUM SERPL-SCNC: 139 MMOL/L (ref 136–145)
WBC # BLD AUTO: 5.7 X10(3) UL (ref 4–11)

## 2024-02-19 PROCEDURE — 36415 COLL VENOUS BLD VENIPUNCTURE: CPT

## 2024-02-19 PROCEDURE — 80053 COMPREHEN METABOLIC PANEL: CPT

## 2024-02-19 PROCEDURE — 82728 ASSAY OF FERRITIN: CPT

## 2024-02-19 PROCEDURE — 83540 ASSAY OF IRON: CPT

## 2024-02-19 PROCEDURE — 85025 COMPLETE CBC W/AUTO DIFF WBC: CPT

## 2024-02-19 PROCEDURE — 83550 IRON BINDING TEST: CPT

## 2024-02-20 ENCOUNTER — PATIENT MESSAGE (OUTPATIENT)
Dept: FAMILY MEDICINE CLINIC | Facility: CLINIC | Age: 49
End: 2024-02-20

## 2024-02-20 LAB
DEPRECATED HBV CORE AB SER IA-ACNC: 3.2 NG/ML
IRON SATN MFR SERPL: 11 %
IRON SERPL-MCNC: 63 UG/DL
TIBC SERPL-MCNC: 550 UG/DL (ref 240–450)
TRANSFERRIN SERPL-MCNC: 369 MG/DL (ref 200–360)

## 2024-02-20 NOTE — TELEPHONE ENCOUNTER
----- Message from SISSY Chin sent at 2/20/2024  7:51 AM CST -----  Slight anemia added iron studies

## 2024-02-20 NOTE — TELEPHONE ENCOUNTER
From: Tatiana Theodore  To: Halle Albarado  Sent: 2/20/2024 11:33 AM CST  Subject: Blood test    Hi  What does the test say about me  Thanks

## 2024-02-23 ENCOUNTER — HOSPITAL ENCOUNTER (OUTPATIENT)
Dept: GENERAL RADIOLOGY | Age: 49
Discharge: HOME | End: 2024-02-23
Attending: NURSE PRACTITIONER
Payer: COMMERCIAL

## 2024-02-23 DIAGNOSIS — R20.2 PARESTHESIA AND PAIN OF BOTH UPPER EXTREMITIES: ICD-10-CM

## 2024-02-23 DIAGNOSIS — M79.602 PARESTHESIA AND PAIN OF BOTH UPPER EXTREMITIES: ICD-10-CM

## 2024-02-23 DIAGNOSIS — M79.601 PARESTHESIA AND PAIN OF BOTH UPPER EXTREMITIES: ICD-10-CM

## 2024-02-23 PROCEDURE — 72050 X-RAY EXAM NECK SPINE 4/5VWS: CPT | Performed by: NURSE PRACTITIONER

## 2024-03-11 ENCOUNTER — PATIENT MESSAGE (OUTPATIENT)
Dept: FAMILY MEDICINE CLINIC | Facility: CLINIC | Age: 49
End: 2024-03-11

## 2024-03-11 DIAGNOSIS — M54.6 DORSALGIA OF THORACIC REGION: Primary | ICD-10-CM

## 2024-03-11 NOTE — TELEPHONE ENCOUNTER
From: Tatiana Theodore  To: Halle Albarado  Sent: 3/11/2024 11:52 AM CDT  Subject: Referral MRI    I’m still in pain   Symptoms never go away   Also my right leg Nerve that was injured from my surgery ,,, is still numb and swollen   Also can you have the MRI look at my back where my shoulder blades are?  I have muscle spasms and warm tingling   Thanks

## 2024-03-12 ENCOUNTER — PATIENT MESSAGE (OUTPATIENT)
Dept: FAMILY MEDICINE CLINIC | Facility: CLINIC | Age: 49
End: 2024-03-12

## 2024-03-12 NOTE — TELEPHONE ENCOUNTER
Will start with Xray of thoracic spine and then schedule MRI make sure its covered , ok to refer to spine as well

## 2024-03-12 NOTE — TELEPHONE ENCOUNTER
From: Tatiana Theodore  To: Halle Albarado  Sent: 3/12/2024 10:18 AM CDT  Subject: Xray    What if this is a pinch nerve?

## 2024-03-13 ENCOUNTER — PATIENT MESSAGE (OUTPATIENT)
Dept: FAMILY MEDICINE CLINIC | Facility: CLINIC | Age: 49
End: 2024-03-13

## 2024-03-13 ENCOUNTER — HOSPITAL ENCOUNTER (OUTPATIENT)
Dept: GENERAL RADIOLOGY | Age: 49
Discharge: HOME | End: 2024-03-13
Attending: NURSE PRACTITIONER
Payer: COMMERCIAL

## 2024-03-13 DIAGNOSIS — M54.6 DORSALGIA OF THORACIC REGION: ICD-10-CM

## 2024-03-13 PROCEDURE — 72072 X-RAY EXAM THORAC SPINE 3VWS: CPT | Performed by: NURSE PRACTITIONER

## 2024-03-13 NOTE — TELEPHONE ENCOUNTER
From: Tatiana Theodore  To: Halle Albarado  Sent: 3/13/2024 8:31 AM CDT  Subject: Water retention     I’ve been retaining water in my legs and ankles also my hands . Every day, especially at the end of my day . If I put my feet up above my heart , that helps.  Is there a water pill??? Very mild/ low dose I can take?  Thanks

## 2024-03-14 ENCOUNTER — SPINE CENTER NAVIGATION (OUTPATIENT)
Dept: SURGERY | Facility: CLINIC | Age: 49
End: 2024-03-14

## 2024-03-14 DIAGNOSIS — M54.12 CERVICAL RADICULOPATHY: Primary | ICD-10-CM

## 2024-03-14 NOTE — PROGRESS NOTES
Spine Center Referral Navigation Encounter Note    Referred by: Halle Albarado     HPI: Symptoms started in September after surgery- about 4 days post op. Nerve injury at that time to leg. Symptoms started in hands and arms.     Subjective Symptoms: Tingling between shoulder blades and from elbows to hands. Neck and arms are stiff. Decreased sensation/numbness. Feels like hands are swollen.    Subjective Pertinent Negatives: No weakness. No falls. Changes in gait after nerve injury, which has resolved after PT. No bowel or bladder incontinence.     Physical Therapy: Not for spine - recently for femoral nerve injury.     Medications for Spine Symptoms: None    Imaging: Xray    Previous Spine Injections: No    Previous Spine Surgery: No    Previously Seen Spine Care Providers: No    Information above is from chart review and patient reported.     Referred to: [facility]TAMIA

## 2024-03-28 ENCOUNTER — OFFICE VISIT (OUTPATIENT)
Dept: PHYSICAL MEDICINE AND REHAB | Facility: CLINIC | Age: 49
End: 2024-03-28
Payer: COMMERCIAL

## 2024-03-28 VITALS — WEIGHT: 151 LBS | BODY MASS INDEX: 24.27 KG/M2 | RESPIRATION RATE: 18 BRPM | HEIGHT: 66 IN

## 2024-03-28 DIAGNOSIS — M54.2 NECK PAIN: Primary | ICD-10-CM

## 2024-03-28 DIAGNOSIS — M47.812 CERVICAL SPONDYLOSIS: ICD-10-CM

## 2024-03-28 DIAGNOSIS — R20.0 HAND NUMBNESS: ICD-10-CM

## 2024-03-28 PROCEDURE — 99204 OFFICE O/P NEW MOD 45 MIN: CPT | Performed by: PHYSICAL MEDICINE & REHABILITATION

## 2024-03-28 PROCEDURE — 3008F BODY MASS INDEX DOCD: CPT | Performed by: PHYSICAL MEDICINE & REHABILITATION

## 2024-03-28 RX ORDER — GABAPENTIN 250 MG/5ML
300 SOLUTION ORAL 3 TIMES DAILY
Qty: 470 ML | Refills: 0 | Status: SHIPPED | OUTPATIENT
Start: 2024-03-28 | End: 2024-04-27

## 2024-03-28 NOTE — PATIENT INSTRUCTIONS
Please start gabapentin titration.    WEEK  AM  NOON  PM   1 - - 300mg   2 300mg - 300mg   3 300mg 300mg 300mg     Call my office to schedule an EMG with me. [phone]    As you are able, get started in physical therapy working on your neck and soft tissues.

## 2024-03-28 NOTE — PROGRESS NOTES
NEW PATIENT VISIT    CHIEF COMPLAINT  Neck Pain    Spine center referral    HISTORY OF PRESENTING ILLNESS  Tatiana Theodore is a 48 year old female who presents for evaluation of neck pain referred to me by the spine center.  Patient states that she has been experiencing neck pain and symptoms in the hands and arms since September 2023.  Patient endorses tingling between the shoulder blades from the forearms to the hands.  States the neck and the arms are stiff.  She has decreased sensation and numbness and feels like her hands are swollen.    She feels that she has sensation of pain and tingling up her arms when doing fine motor tasks. She states that she has stiffness and pain in the neck and between the shoulder blades.     Patient states that onset of her symptoms were after hysterectomy performed in September 2023.    Patient denies weakness, no changes in gait.  No bowel or bladder incontinence.    She was worked up by her primary care provider, there is an MRI of the thoracic spine scheduled, x-ray of the thoracic spine has been completed.  This is reviewed in full below.    No EMG previously, 1 has been ordered.  Pain wakes her up at night. Difficulty sleeping on her side. Endorses swelling in the hands.     Physical therapy: None directed at the neck  Medications: None  Previous injections: None  Previous surgeries spine: None  Occupation:     PAST MEDICAL HISTORY  Past Medical History:   Diagnosis Date    Abnormal Pap smear of cervix 1997    Calculus of kidney     Migraines        PAST SURGICAL HISTORY  Past Surgical History:   Procedure Laterality Date    BENIGN BIOPSY LEFT  2015    BENIGN BIOPSY LEFT  2015    BENIGN BIOPSY LEFT  2016    BENIGN BIOPSY LEFT  2016    BENIGN BIOPSY LEFT  2016    BREAST BIOPSY      x2    COLPOSCOPY,BX CERVIX/ENDOCERV CURR  1997    nl    EXCISIONAL BIOPSY LEFT      age 16    TOTAL ABDOMINAL HYSTERECTOMY N/A 9/29/2023    Procedure: ABDOMINAL HYSTERECTOMY;  Surgeon:  Manuel Lewis DO;  Location: [facility] MAIN OR       MEDICATIONS  Current Outpatient Medications on File Prior to Visit   Medication Sig Dispense Refill    NON FORMULARY Take 2 tablets by mouth every morning. Homeopathic hormonal supplement       No current facility-administered medications on file prior to visit.       ALLERGIES  Allergies   Allergen Reactions    Monosodium Glutamate OTHER (SEE COMMENTS)     migraines    Nitrates OTHER (SEE COMMENTS)     Migraines      Nuts OTHER (SEE COMMENTS)     Migraines      Peanuts OTHER (SEE COMMENTS)     Migraines      Phosphate Enema [Saline Laxative] OTHER (SEE COMMENTS)     migraine    Yellow Dye OTHER (SEE COMMENTS)     migraines    Seasonal RASH     Poison ivy, mold       SOCIAL HISTORY   reports that she quit smoking about 13 years ago. Her smoking use included cigarettes. She has never used smokeless tobacco. She reports that she does not currently use alcohol. She reports that she does not use drugs.    FAMILY HISTORY  Family History   Problem Relation Age of Onset    Obesity Father     Heart Disorder Father         CHF    Diabetes Mother     Lipids Mother     Other (Other) Maternal Grandmother         DEMENTIA    Diabetes Maternal Grandfather     Other (Other) Paternal Grandmother         DEMENTIA    Heart Disorder Paternal Grandfather         AAA    Lipids Daughter        REVIEW OF SYSTEMS  Complete review of systems was performed and was negative except for those items stated in the History of Presenting Illness and Past Medical/Surgical History.    PHYSICAL EXAMINATION  GENERAL:  In no acute distress. Well-developed and well nourished.   SKIN: No rashes or open wounds involving the upper extremities and neck.  NEUROLOGIC:   Strength: 5/5 throughout bilateral upper and lower extremities in all major muscle groups.   Sensation: intact light touch sensation throughout bilateral upper and lower extremities.   Reflexes: intact and symmetric in bilateral upper and lower  extremities. Tobin’s negative. Babinski downgoing bilaterally. No clonus.   Gait: able to heel walk, toe walk, and perform tandem gait.   MUSCULOSKELETAL:  Inspection: shoulders in protracted positions, lower cervical flexion, upper cervical extension posture. No scapular winging or muscular atrophy.  Palpation: tenderness was present over cervical paraspinals and trapezius bilaterally as well as through the periscapular musculature bilaterally.  ROM:   Cervical: She has some tenderness to palpation of the cervical paraspinal musculature, range of motion of the cervical spine is full with mild exacerbation of pain with extension and sidebending to the right into the left   Shoulder: full in all planes and pain free.  Special Tests:   Spurling's: Negative   Lhermitte’s: Negative   Hawkin’s / Neer’s: Negative  Neck pain with ROM  Durkens Positive bilaterally  Tinels negataive bilaterally    REVIEW OF PRIOR X-RAYS/STUDIES  Independently reviewed the x-ray of the cervical spine dated 2/23/2024 which feel straightening of the cervical lordosis.  Mild degenerative changes.    Additionally reviewed the x-ray of the thoracic spine dated 2/13/2024, This does not reveal significant degenerative changes, there is normal thoracic kyphosis.  Largely unremarkable.    IMPRESSION/DIAGNOSIS  Encounter Diagnoses   Name Primary?    Neck pain Yes    Hand numbness      Cervical radic versus CTS bilaterally     TREATMENT/PLAN  Agree with EMG, encouraged the patient to schedule this with me soon so we can delineate between cervical radicular symptoms and suspected carpal tunnel syndrome bilaterally.  Additionally patient will get started in physical therapy working on the cervical spine as well as development of home exercise plan.    She will follow-up with me for the EMG at which time he there are more advanced imaging of the cervical spine versus carpal tunnel intervention may be indicated.    Education was provided regarding the  above impression/diagnosis and treatment options/plan were discussed.  All questions were answered during today's visit.  Patient will contact clinic if any other questions or concerns.            Mohit Kirkland DO  Interventional Spine and Sports Medicine Specialist   Physical Medicine and Rehabilitation  [address] [address]    [facility]  [address] [place] [address]

## 2024-04-01 ENCOUNTER — TELEPHONE (OUTPATIENT)
Dept: PHYSICAL THERAPY | Facility: HOSPITAL | Age: 49
End: 2024-04-01

## 2024-04-03 ENCOUNTER — OFFICE VISIT (OUTPATIENT)
Dept: PHYSICAL THERAPY | Age: 49
End: 2024-04-03
Attending: PHYSICAL MEDICINE & REHABILITATION
Payer: COMMERCIAL

## 2024-04-03 DIAGNOSIS — M47.812 CERVICAL SPONDYLOSIS: ICD-10-CM

## 2024-04-03 DIAGNOSIS — R20.0 HAND NUMBNESS: ICD-10-CM

## 2024-04-03 DIAGNOSIS — M54.2 NECK PAIN: Primary | ICD-10-CM

## 2024-04-03 PROCEDURE — 97161 PT EVAL LOW COMPLEX 20 MIN: CPT | Performed by: PHYSICAL THERAPIST

## 2024-04-03 PROCEDURE — 97110 THERAPEUTIC EXERCISES: CPT | Performed by: PHYSICAL THERAPIST

## 2024-04-03 NOTE — PROGRESS NOTES
SPINE EVALUATION:     Diagnosis:   Neck pain (M54.2)  Hand numbness (R20.0)  Cervical spondylosis (M47.812)      Referring Provider: Mohit Kirkland  Date of Evaluation:    4/3/2024    Precautions:  Drug Allergy Next MD visit:   none scheduled  Date of Surgery: n/a     PATIENT SUMMARY   Tatiana Theodore is a 48 year old female who presents to therapy today with complaints of bilateral hand numbness that has been present since November 2023. She had some numbness prior to that but numbness has increased.  Pain in both sides of neck and into the shoulder blades.  There was no no known injury.  Pain is increased with holding steering wheel at work. There is less numbness if she puts her arm down. She still has burning and swelling in her arms. There is some relief with exercise/walking. She is right hand dominant.  Numbness is in both hands all fingers.   Pt describes pain level current 5/10, at best 8/10, at worst 10/10.   Current functional limitations include difficulty with sleeping on her side, she is sleeping better now that she is on a futon, difficulty tolerating driving.  Tatiana describes prior level of function as independent with ADLS.  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 Exercise includes abdominal exercises. Pt goals include find out what is wrong, feel better  Past medical history was reviewed with Tatiana. Significant findings include   Past Medical History:   Diagnosis Date    Abnormal Pap smear of cervix 1997    Calculus of kidney     Migraines       Pt denies diplopia, dysarthria, dysphasia, dizziness, drop attacks, bowel/bladder changes, saddle anesthesia, and CECILIO LE N/T.    ASSESSMENT  Tatiana presents to physical therapy evaluation with primary c/o neck pain with referral to filipe MENDENHALL. The results of the objective tests and measures show overall cervical ROM is WNL. Pain is increased with flexion of cervical spine. Strength is WNL.  Functional deficits include but are not limited to difficulty sleeping,  difficulty tolerating driving.  Signs and symptoms are consistent with diagnosis of cervical strain/radicular complaints. Removed flexion based exercises from patient's exercise routine as they appear to contribute to pain. . Pt and PT discussed evaluation findings, pathology, POC and HEP.  Pt voiced understanding and performs HEP correctly without reported pain. Skilled Physical Therapy is medically necessary to address the above impairments and reach functional goals.     OBJECTIVE:   Observation/Posture: Unremarkable    Right quad tightness,   Get rid of flexion exercises   50 pounds bilat  Left quad symptoms with flexion quadruped   D2 horizontal abduction     Cervical AROM: (* denotes performed with pain)  Flexion: WNL*  Extension: WNL   Sidebending: R WNL; L WNL  Rotation: R WNL; L WNL    Accessory motion: Cervical pain with PAS lower cervical spine. Upper cervical mobility left rotation - left side cervical pain   Palpation: No acute soft tissue tenderness     Strength: (* denotes performed with pain)  Bilateral UE strength 5/5. LE strength WNL  : 50 pounds bilat    Flexibility:   Bilateral UE/LE ROM WNL  Quadruped rock with spinal flexion - left thigh numbness, rock with hip hinge - no pain     Special tests:   Spurling's - cervical pain    Gait: pt ambulates on level ground with normal mechanics.  Balance: SLS: minimum of 15 seconds right and left LE    Today’s Treatment and Response:   Pt education was provided on exam findings, treatment diagnosis, treatment plan, expectations, and prognosis. Pt was also provided recommendations for postural corrections use of towel for support at cervical lordosis. She is to perform cervical extension with support to decrease symptoms when present. Patient demonstrated abdominal exercises she has been performing including  \"V\" ups and twists. Patient instructed to discontinue all flexion based exercises for now. Patient was able to appreciate impact of cervical  flexion/spinal flexion on symptoms.  She was appreciative of the assistance in her care   Patient was instructed in and issued a HEP for: horizontal abduction and D2 UE flexion with green band    Charges: PT Jennifer Low Complexity, TherEx      Total Timed Treatment: 10 min     Total Treatment Time: 45 min     Based on clinical rationale and outcome measures, this evaluation involved Low Complexity decision making   PLAN OF CARE:    Goals: (to be met in 5 visits)   Patient will report improved sleep and tolerance for driving  No pain with spinal/cervical flexion in quadruped   Patient will report reduction in UE complaints     Frequency / Duration: Patient will be seen for 2 x/week or a total of 5 visits over a 90 day period. Treatment will include: Therapeutic Exercise    Education or treatment limitation: None  Rehab Potential:good    Oswestry Disability Index Score  Score: 8 % (4/2/2024 11:19 AM)      Patient/Family/Caregiver was advised of these findings, precautions, and treatment options and has agreed to actively participate in planning and for this course of care.    Thank you for your referral. Please co-sign or sign and return this letter via fax as soon as possible to [phone]. If you have any questions, please contact me at Dept: [phone]    Sincerely,  Electronically signed by therapist: Bonny Aguilar, SREEDHAR    Physician's certification required: Yes  I certify the need for these services furnished under this plan of treatment and while under my care.    X___________________________________________________ Date____________________    Certification From: 4/3/2024  To:7/2/2024

## 2024-04-09 ENCOUNTER — APPOINTMENT (OUTPATIENT)
Dept: PHYSICAL THERAPY | Age: 49
End: 2024-04-09
Attending: PHYSICAL MEDICINE & REHABILITATION
Payer: COMMERCIAL

## 2024-04-17 ENCOUNTER — PROCEDURE VISIT (OUTPATIENT)
Dept: PHYSICAL MEDICINE AND REHAB | Facility: CLINIC | Age: 49
End: 2024-04-17
Payer: COMMERCIAL

## 2024-04-17 DIAGNOSIS — G56.03 BILATERAL CARPAL TUNNEL SYNDROME: Primary | ICD-10-CM

## 2024-04-17 PROCEDURE — 95886 MUSC TEST DONE W/N TEST COMP: CPT | Performed by: PHYSICAL MEDICINE & REHABILITATION

## 2024-04-17 PROCEDURE — 95911 NRV CNDJ TEST 9-10 STUDIES: CPT | Performed by: PHYSICAL MEDICINE & REHABILITATION

## 2024-04-17 NOTE — PROCEDURES
[facility]  [address] [place] [address]        Full Name: Tatiana Cole Gender: Female  Patient ID: [id number] YOB: 1975      Visit Date: 4/17/2024 9:52 AM  Age: 48 Years  Examining Physician: Dr. Kirkland  Referring Physician: Dr. Mohit Kirkland  Height: 5 feet 6 inch  Weight: 145 lbs  History: EMG BUE- denies new/worsening symptoms to report. Denies thyroid, Denies diabetes. Denies daily alcohol.    Ongoing pain and tingling in bilateral hands      Sensory NCS      Nerve / Sites Rec. Site Onset Lat Peak Lat NP Amp PP Amp Segments Distance Velocity Comment     ms ms µV µV  cm m/s    R Median - Dig II (Antidromic)      Wrist Index 4.69 6.35 20.2 20.5 Wrist - Index 14 30    L Median - Dig II (Antidromic)      Wrist Index 4.79 6.51 14.5 38.0 Wrist - Index 14 29    R Ulnar - Dig V (Antidromic)      Wrist Dig V 2.50 3.18 40.3 63.5 Wrist - Dig V 14 56    L Ulnar - Dig V (Antidromic)      Wrist Dig V 2.76 3.65 56.8 85.2 Wrist - Dig V 14 51    R Radial - Superficial (Antidromic)      Forearm Wrist 1.93 2.55 25.3 32.8 Forearm - Wrist 10 52    L Radial - Superficial (Antidromic)      Forearm Wrist 1.82 2.45 31.1 44.9 Forearm - Wrist 10 55        Motor NCS      Nerve / Sites Muscle Latency Amplitude Segments Dist. Lat Diff Velocity Comments     ms mV  cm ms m/s    R Median - APB      Wrist APB 8.46 9.4 Wrist - APB 8         Elbow APB 12.54 7.3 Elbow - Wrist 21 4.08 51.4    L Median - APB      Wrist APB 8.25 9.8 Wrist - APB 8         Elbow APB 12.27 8.4 Elbow - Wrist 21 4.02 52.2    R Ulnar - ADM      Wrist ADM 2.67 11.6 Wrist - ADM 8         B.Elbow ADM 6.04 10.8 B.Elbow - Wrist 21 3.37 62.2       A.Elbow ADM 7.88 10.7 A.Elbow - B.Elbow 10 1.83 54.5    L Ulnar - ADM      Wrist ADM 2.98 12.1 Wrist - ADM 8         B.Elbow ADM 6.25 11.8 B.Elbow - Wrist 21 3.27 64.2       A.Elbow ADM 8.44 11.5 A.Elbow - B.Elbow 12 2.19 54.9        EMG Summary Table     Spontaneous MUAP Recruitment    Muscle IA Fib PSW Fasc H.F. Amp Dur. PPP Pattern   L. Deltoid N None None None None N N N N   R. Deltoid N None None None None N N N N   L. Triceps brachii N None None None None N N N N   R. Triceps brachii N None None None None N N N N   L. Biceps brachii N None None None None N N N N   R. Biceps brachii N None None None None N N N N   L. Pronator teres N None None None None N N N N   R. Pronator teres N None None None None N N N N   L. First dorsal interosseous N None None None None N N N N   R. First dorsal interosseous N None None None None N N N N   L. Abductor pollicis brevis 1+ None 1+ None None 1+ N N Reduced   R. Abductor pollicis brevis 1+ None None None None N N N Reduced       Summary    The motor conduction test was performed on 4 nerve(s). The results were normal in 2 nerve(s): R Ulnar - ADM, L Ulnar - ADM. Results outside the specified normal range were found in 2 nerve(s), as follows:  In the R Median - APB study  the take off latency result was increased for Wrist stimulation  In the L Median - APB study  the take off latency result was increased for Wrist stimulation    The sensory conduction test was performed on 6 nerve(s). The results were normal in 4 nerve(s): R Ulnar - Dig V (Antidromic), L Ulnar - Dig V (Antidromic), R Radial - Superficial (Antidromic), L Radial - Superficial (Antidromic). Results outside the specified normal range were found in 2 nerve(s), as follows:  In the R Median - Dig II (Antidromic) study  the peak latency result was increased for Wrist stimulation  In the L Median - Dig II (Antidromic) study  the peak latency result was increased for Wrist stimulation    The needle EMG examination was performed in 12 muscles. It was normal in 10 muscle(s): L. Deltoid, R. Deltoid, L. Triceps brachii, R. Triceps brachii, L. Biceps brachii, R. Biceps brachii, L. Pronator teres, R. Pronator teres, L. First dorsal interosseous, R. First dorsal interosseous. The study was abnormal in 2  muscle(s), with the following distribution:  Abnormal spontaneous/insertional activity was found in L. Abductor pollicis brevis, R. Abductor pollicis brevis.  The MUP waveform abnormality was found in L. Abductor pollicis brevis.  Abnormal interference pattern was found in L. Abductor pollicis brevis, R. Abductor pollicis brevis.        Tatiana Ownes [id number] 4/17/2024 9:52 AM     3 of 4    Conclusion:     This is an abnormal study.    1.  There is electrodiagnostic evidence to support a bilateral median mononeuropathy at or distal to the wrist (carpal tunnel syndrome) electrically moderate with active denervation changes noted on needle study.  2.  There is no evidence to support a right or left-sided ulnar mononeuropathy.  2.  There is no evidence to support a right or left-sided cervical (C5-T1) radiculopathy.        Mohit Kirkland DO  Interventional Spine and Sports Medicine Specialist   Physical Medicine and Rehabilitation  [address] [address]    [address] [place] [address]        ________________________  Dr. Isael Simpson Ownes [id number] 4/17/2024 9:52 AM     3 of 4

## 2024-04-19 ENCOUNTER — TELEPHONE (OUTPATIENT)
Dept: PHYSICAL MEDICINE AND REHAB | Facility: CLINIC | Age: 49
End: 2024-04-19

## 2024-04-19 NOTE — TELEPHONE ENCOUNTER
PATIENT NAME:  ELAINA NASCIMENTO/ [phone] (home)/ There is no work phone number on file.  PATIENT :  09/15/1975  REFERRAL ID #:  [id number]  REFERRAL STATUS:  Authorized [1]  REVIEW REFERRAL NOTES FOR MORE INFORMATION:  DATE AUTHORIZED:  2024 // EXPIRATION DATE: 2025   REFERRED BY:  AUBRIE DILL MD (TEL: [phone])  REFERRED TO: AUBRIE DILL MD (TEL: [phone])     No vendor specified on referral. / No vendor phone number known.  No facility specified on referral  REFERRED FOR:    Diagnoses:    G56.03 (ICD-10-CM) - 354.0 (ICD-9-CM) - Bilateral carpal tunnel syndrome  Procedures:     [id number] - SPECIALTY (OTHER) - INTERNAL   NUMBER OF VISITS AUTH: 1  Approved: Bilateral carpal tunnel injection under ultrasound guidance    Patient has been scheduled appropriately.

## 2024-04-24 ENCOUNTER — APPOINTMENT (OUTPATIENT)
Dept: PHYSICAL THERAPY | Age: 49
End: 2024-04-24
Attending: PHYSICAL MEDICINE & REHABILITATION
Payer: COMMERCIAL

## 2024-04-29 ENCOUNTER — APPOINTMENT (OUTPATIENT)
Dept: PHYSICAL THERAPY | Age: 49
End: 2024-04-29
Attending: PHYSICAL MEDICINE & REHABILITATION
Payer: COMMERCIAL

## 2024-05-01 ENCOUNTER — OFFICE VISIT (OUTPATIENT)
Dept: PHYSICAL MEDICINE AND REHAB | Facility: CLINIC | Age: 49
End: 2024-05-01
Payer: COMMERCIAL

## 2024-05-01 ENCOUNTER — APPOINTMENT (OUTPATIENT)
Dept: PHYSICAL THERAPY | Age: 49
End: 2024-05-01
Attending: PHYSICAL MEDICINE & REHABILITATION

## 2024-05-01 DIAGNOSIS — G56.03 BILATERAL CARPAL TUNNEL SYNDROME: Primary | ICD-10-CM

## 2024-05-01 PROCEDURE — 76942 ECHO GUIDE FOR BIOPSY: CPT | Performed by: PHYSICAL MEDICINE & REHABILITATION

## 2024-05-01 PROCEDURE — 20526 THER INJECTION CARP TUNNEL: CPT | Performed by: PHYSICAL MEDICINE & REHABILITATION

## 2024-05-01 NOTE — PROCEDURES
[facility]  Carpal Tunnel Injection Procedure Note    CHIEF COMPLAINT: Bilateral carpal tunnel syndrome      PROCEDURE PERFORMED: Bilateral carpal tunnel corticosteroid injection under ultrasound guidance    INDICATIONS: Bilateral carpal tunnel syndrome     PRIMARY PROCEDURALIST:  Mohit Kirkland DO    INFORMED CONSENT & TIME OUT:   As documented in the Time Out and Pre-Procedure Check Lists.  Written consent was obtained    PROCEDURE:  The LEFT wrist was prepped and draped in the standard sterile fashion using 3 sticks of Betadine. Using a linear high frequency probe, the LEFT median nerve was identified in the carpal tunnel in the transverse plane.  The ulnar artery and veins were visualized.  Skin refrigerant was used to anesthetize the skin.  Then, A 30-gauge 0.5 inch needle was inserted out of plane technique.  Once the needle was seen abutting the LEFT median nerve, a mixture of 2 cc 1% lidocaine and 1 cc of kenalog containing 40 mg of corticosteroid was visualized being injected around the median nerve.  The needle was then removed, hemostasis was obtained, Band-Aid was applied.  Patient tolerated the procedure well.  The patient was able to get up and ambulate.  The patient had full feeling in the left hand.  Permanent pictures were obtained    Then:    The RIGHT wrist was prepped and draped in the standard sterile fashion using 3 sticks of Betadine. Using a linear high frequency probe, the RIGHT median nerve was identified in the carpal tunnel in the transverse plane.  The ulnar artery and veins were visualized.  Skin refrigerant was used to anesthetize the skin.  Then, A 30-gauge 0.5 inch needle was inserted using out of plane technique.  Once the needle was seen abutting the RIGHT  median nerve, a mixture of 2 cc 1% lidocaine and 1 cc of kenalog containing 40 mg of corticosteroid was visualized being injected around the median nerve.  The needle was then  removed, hemostasis was obtained, Band-Aid was applied.  Patient tolerated the procedure well.  The patient was able to get up and ambulate.  The patient had full feeling in the right hand.  Permanent pictures were obtained      Mohit Kirkland DO  Physical Medicine and Rehabilitation / Sports Medicine   [facility]

## 2024-05-02 RX ORDER — TRIAMCINOLONE ACETONIDE 40 MG/ML
80 INJECTION, SUSPENSION INTRA-ARTICULAR; INTRAMUSCULAR ONCE
Status: COMPLETED | OUTPATIENT
Start: 2024-05-02 | End: 2024-05-02

## 2024-05-02 RX ORDER — LIDOCAINE HYDROCHLORIDE 10 MG/ML
4 INJECTION, SOLUTION INFILTRATION; PERINEURAL ONCE
Status: COMPLETED | OUTPATIENT
Start: 2024-05-02 | End: 2024-05-02

## 2024-05-06 ENCOUNTER — APPOINTMENT (OUTPATIENT)
Dept: PHYSICAL THERAPY | Age: 49
End: 2024-05-06
Attending: PHYSICAL MEDICINE & REHABILITATION

## 2024-05-07 ENCOUNTER — TELEPHONE (OUTPATIENT)
Dept: PHYSICAL THERAPY | Age: 49
End: 2024-05-07

## 2024-05-13 ENCOUNTER — APPOINTMENT (OUTPATIENT)
Dept: PHYSICAL THERAPY | Age: 49
End: 2024-05-13
Attending: PHYSICAL MEDICINE & REHABILITATION

## 2024-05-15 ENCOUNTER — APPOINTMENT (OUTPATIENT)
Dept: PHYSICAL THERAPY | Age: 49
End: 2024-05-15
Attending: PHYSICAL MEDICINE & REHABILITATION

## 2024-05-20 ENCOUNTER — TELEPHONE (OUTPATIENT)
Dept: FAMILY MEDICINE CLINIC | Facility: CLINIC | Age: 49
End: 2024-05-20

## 2024-05-20 ENCOUNTER — MED REC SCAN ONLY (OUTPATIENT)
Dept: FAMILY MEDICINE CLINIC | Facility: CLINIC | Age: 49
End: 2024-05-20

## 2024-05-20 ENCOUNTER — OFFICE VISIT (OUTPATIENT)
Dept: FAMILY MEDICINE CLINIC | Facility: CLINIC | Age: 49
End: 2024-05-20

## 2024-05-20 VITALS
OXYGEN SATURATION: 96 % | SYSTOLIC BLOOD PRESSURE: 118 MMHG | HEART RATE: 80 BPM | DIASTOLIC BLOOD PRESSURE: 76 MMHG | RESPIRATION RATE: 16 BRPM | HEIGHT: 66 IN | WEIGHT: 153 LBS | BODY MASS INDEX: 24.59 KG/M2

## 2024-05-20 DIAGNOSIS — G62.9 NEUROPATHY: Primary | ICD-10-CM

## 2024-05-20 DIAGNOSIS — Z12.4 SCREENING FOR CERVICAL CANCER: ICD-10-CM

## 2024-05-20 DIAGNOSIS — Z12.11 COLON CANCER SCREENING: ICD-10-CM

## 2024-05-20 DIAGNOSIS — G56.03 BILATERAL CARPAL TUNNEL SYNDROME: ICD-10-CM

## 2024-05-20 DIAGNOSIS — G43.709 CHRONIC MIGRAINE W/O AURA W/O STATUS MIGRAINOSUS, NOT INTRACTABLE: ICD-10-CM

## 2024-05-20 DIAGNOSIS — Z00.00 LABORATORY EXAMINATION ORDERED AS PART OF A ROUTINE GENERAL MEDICAL EXAMINATION: ICD-10-CM

## 2024-05-20 RX ORDER — RIMEGEPANT SULFATE 75 MG/75MG
75 TABLET, ORALLY DISINTEGRATING ORAL
Qty: 4 TABLET | Refills: 0 | COMMUNITY
Start: 2024-05-20 | End: 2024-05-24

## 2024-05-20 NOTE — TELEPHONE ENCOUNTER
Patient was referred to Dr Saeed today, but she has been seeing Dr Kirkland and would like the referral changed to him.

## 2024-05-20 NOTE — TELEPHONE ENCOUNTER
Referral to Dr. Kirkland placed today by provider. Referral status is open. "[employer] message sent to pt

## 2024-05-20 NOTE — PROGRESS NOTES
[facility] Office Note  Chief Complaint:   Chief Complaint   Patient presents with    Follow - Up     After hysteroctomy she needs specialist for injury.    Medication Request     For migraines.       HPI:   This is a 48 year old female coming in for follow up.    H/o SILVINA, uterine fibroids - s/p [facility] 2023 w/ gyn onc Dr Lewis. Recently seen last week for repeat pap, results pending.      She experienced possible R femoral nerve injury from hysterectomy. Has completed PT. Has noted some improvement in symptoms but does report numbness in knee, R inner thigh as well as R leg weakness. She is able to walk/run now. Denies any associated pain.     B/L CTS - following with pain mgmt. Dr Kirkland. Needs new referral. Recently got b/l cortisone injection and notes improvement in sx.     H/o chronic migraines - requesting to try nurtec. Has tried multiple abortive agents int he past with minimal improvement. Reports 2 episodes in last few weeks. Migraines can last 1-2 days. Usually takes excedrin which helps.     Past Medical History:    Abnormal Pap smear of cervix    Calculus of kidney    Migraines     Past Surgical History:   Procedure Laterality Date    Benign biopsy left  2015    Benign biopsy left  2015    Benign biopsy left  2016    Benign biopsy left  2016    Benign biopsy left  2016    Breast biopsy      x2    Colposcopy,bx cervix/endocerv curr  1997    nl    Excisional biopsy left      age 16    Total abdominal hysterectomy N/A 2023    Procedure: ABDOMINAL HYSTERECTOMY;  Surgeon: Manuel Lewis DO;  Location: [facility] MAIN OR     Social History:  Social History     Socioeconomic History    Marital status:    Tobacco Use    Smoking status: Former     Current packs/day: 0.00     Types: Cigarettes     Quit date:      Years since quittin.3    Smokeless tobacco: Never   Vaping Use    Vaping status: Never Used   Substance and Sexual Activity    Alcohol use: Not Currently      Alcohol/week: 0.0 standard drinks of alcohol    Drug use: No    Sexual activity: Yes     Partners: Male   Other Topics Concern    Caffeine Concern Yes    Exercise Yes     Social Determinants of Health      Received from [facility]     Family History:  Family History   Problem Relation Age of Onset    Obesity Father     Heart Disorder Father         CHF    Diabetes Mother     Lipids Mother     Other (Other) Maternal Grandmother         DEMENTIA    Diabetes Maternal Grandfather     Other (Other) Paternal Grandmother         DEMENTIA    Heart Disorder Paternal Grandfather         AAA    Lipids Daughter      Allergies:  Allergies   Allergen Reactions    Monosodium Glutamate OTHER (SEE COMMENTS)     migraines    Nitrates OTHER (SEE COMMENTS)     Migraines      Nuts OTHER (SEE COMMENTS)     Migraines      Peanuts OTHER (SEE COMMENTS)     Migraines      Phosphate Enema [Saline Laxative] OTHER (SEE COMMENTS)     migraine    Sulfites UNKNOWN    Yellow Dye OTHER (SEE COMMENTS)     migraines    Seasonal RASH     Poison ivy, mold     Current Meds:  Current Outpatient Medications   Medication Sig Dispense Refill    NON FORMULARY Take 2 tablets by mouth every morning. Homeopathic hormonal supplement        Counseling given: Not Answered       REVIEW OF SYSTEMS:   Review of Systems   A comprehensive 10 point review of systems was completed.  Pertinent positives and negatives noted in the the HPI.    EXAM:   /76   Pulse 80   Resp 16   Ht 5' 6\" (1.676 m)   Wt 153 lb (69.4 kg)   LMP 09/04/2023 (Exact Date)   SpO2 96%   BMI 24.69 kg/m²  Estimated body mass index is 24.69 kg/m² as calculated from the following:    Height as of this encounter: 5' 6\" (1.676 m).    Weight as of this encounter: 153 lb (69.4 kg).   Vital signs reviewed.Appears stated age, well groomed.  Physical Exam  Constitutional:       Appearance: Normal appearance.   HENT:      Head: Normocephalic and atraumatic.   Eyes:      Pupils:  Pupils are equal, round, and reactive to light.   Cardiovascular:      Rate and Rhythm: Normal rate and regular rhythm.      Pulses: Normal pulses.      Heart sounds: Normal heart sounds. No murmur heard.  Pulmonary:      Effort: Pulmonary effort is normal. No respiratory distress.      Breath sounds: Normal breath sounds. No wheezing or rhonchi.   Abdominal:      General: There is no distension.      Tenderness: There is no abdominal tenderness.   Musculoskeletal:      Right lower leg: No edema.      Left lower leg: No edema.      Comments: R hip flexion 4/5  L hip flexion 5/5    R knee flexion 5/5  L knee flexion 5/5    Skin:     Findings: No lesion or rash.   Neurological:      Mental Status: She is alert and oriented to person, place, and time.      Sensory: Sensory deficit (diminished sensation in R inner thigh) present.   Psychiatric:         Mood and Affect: Mood normal.          ASSESSMENT AND PLAN:   1. Neuropathy  Appears c/w femoral nerve injury. Will obtain EMG/NCV. Will refer to neurosurgery. Can also see physiatrist for their input on this.   - EMG/NCV  - Neurosurgery Referral - In Network  - Physiatry Referral - In Network    2. Chronic migraine w/o aura w/o status migrainosus, not intractable  Trial nurtec, provided w/ samples. Reviewed medication administration, SE. F/u PRN.     3. Screening for cervical cancer  Pending pap results done by gyn onc.     4. Laboratory examination ordered as part of a routine general medical examination  - Lipid Panel; Future    5. Bilateral carpal tunnel syndrome  - Physiatry Referral - In Network    6. Colon cancer screening  Due for CRC screening  - Surgery Referral - In Network      Meds & Refills for this Visit:  Requested Prescriptions      No prescriptions requested or ordered in this encounter       Health Maintenance:  Health Maintenance Due   Topic Date Due    Colorectal Cancer Screening  Never done    DTaP,Tdap,and Td Vaccines (1 - Tdap) Never done     Mammogram  04/21/2021    COVID-19 Vaccine (3 - 2023-24 season) 09/01/2023    Zoster Vaccines (1 of 2) Never done    Annual Depression Screening  01/01/2024    Annual Physical  07/12/2024       Patient/Caregiver Education: Patient/Caregiver Education: There are no barriers to learning. Medical education done.   Outcome: Patient verbalizes understanding. Patient is notified to call with any questions, complications, allergies, or worsening or changing symptoms.  Patient is to call with any side effects or complications from the treatments as a result of today.     Problem List:  Patient Active Problem List   Diagnosis    Diffuse cystic mastopathy, unspecified laterality [N60.19]    Urinary retention    Uterine leiomyoma    Pre-op testing    Femoral nerve palsy, right

## 2024-05-22 ENCOUNTER — APPOINTMENT (OUTPATIENT)
Dept: PHYSICAL THERAPY | Age: 49
End: 2024-05-22
Attending: PHYSICAL MEDICINE & REHABILITATION

## 2024-05-29 ENCOUNTER — APPOINTMENT (OUTPATIENT)
Dept: PHYSICAL THERAPY | Age: 49
End: 2024-05-29
Attending: PHYSICAL MEDICINE & REHABILITATION

## 2024-06-20 ENCOUNTER — OFFICE VISIT (OUTPATIENT)
Dept: PHYSICAL MEDICINE AND REHAB | Facility: CLINIC | Age: 49
End: 2024-06-20

## 2024-06-20 VITALS — WEIGHT: 153 LBS | RESPIRATION RATE: 18 BRPM | BODY MASS INDEX: 24.59 KG/M2 | HEIGHT: 66 IN

## 2024-06-20 DIAGNOSIS — G56.03 BILATERAL CARPAL TUNNEL SYNDROME: Primary | ICD-10-CM

## 2024-06-20 PROCEDURE — 99213 OFFICE O/P EST LOW 20 MIN: CPT | Performed by: PHYSICAL MEDICINE & REHABILITATION

## 2024-06-20 PROCEDURE — 3008F BODY MASS INDEX DOCD: CPT | Performed by: PHYSICAL MEDICINE & REHABILITATION

## 2024-06-20 NOTE — PROGRESS NOTES
RETURN PATIENT VISIT    CHIEF COMPLAINT  Bilateral compartment syndrome    INTERVAL HISTORY  Tatiana Theodore is a 48 year old who was last seen in clinic on 5/1/2024, at that visit bilateral carpal tunnel corticosteroid injections were performed under ultrasound guidance.  She endorses about 80 to 85% improvement which persists to today approximately 6 weeks later.  Overall doing very well.  No significant complaints.  Not requiring medication.  She endorses intermittent tingling to the hands but this usually resolves quickly with position changes.    Overall pleased with the injections.    Additionally patient endorses a prior injury or femoral nerve, plans for surveillance of this with an EMG, she has a scheduled with me next month.    Chief Complaint   Patient presents with    Follow - Up     Returning patient follow up. LOV 5/1/24 where a  Bilateral carpal tunnel corticosteroid injection under ultrasound guidance was performed. Admits to 80% improvement and this continues. Overall, voices no complaints. Reports mild intermittent tingling to hands but resolves quickly. CPL 0/10. No meds.         REVIEW OF SYSTEMS  Review of systems was completed with the patient today as pertinent to today's visit    PHYSICAL EXAMINATION  CONSTITUTIONAL: Well-appearing, in no apparent distress  EYES: No scleral icterus or conjunctival hemorrhage  CARDIOVASCULAR: Skin warm and well-perfused, no peripheral edema  RESPIRATORY: Breathing unlabored without accessory muscle use  PSYCHIATRIC: Alert, cooperative, appropriate mood and affect  SKIN: No lesions or rashes on exposed skin  MUSCULOSKELETAL: Good range of motion of bilateral wrist.  Stable sensation and strength.  No changes from musculoskeletal or focal neuro deficit standpoint.    REVIEW OF PRIOR X-RAYS/STUDIES  Independently reviewed the x-ray of the cervical spine dated 2/23/2024 which feel straightening of the cervical lordosis.  Mild degenerative changes.     Additionally  reviewed the x-ray of the thoracic spine dated 2/13/2024, This does not reveal significant degenerative changes, there is normal thoracic kyphosis.  Largely unremarkable.    EMG with bilateral carpal tunnel syndrome    IMPRESSION/DIAGNOSIS  1.    Encounter Diagnosis   Name Primary?    Bilateral carpal tunnel syndrome Yes         TREATMENT/PLAN  Patient with a  Response to bilateral carpal tunnel injections.  These can be repeated in about 3 to 4 weeks if needed, she will contact me around that time if her symptoms are returning.  Continue with home stretching.      Education was provided regarding the above impression/diagnosis and treatment options/plan were discussed.  All questions were answered during today's visit.  Patient will contact clinic if any other questions or concerns.          Mohit Kirkland,   Interventional Spine and Sports Medicine Specialist   Physical Medicine and Rehabilitation  [place] [id number] [place] [id number]    [address] [place] [address]

## 2024-07-24 ENCOUNTER — PROCEDURE VISIT (OUTPATIENT)
Dept: PHYSICAL MEDICINE AND REHAB | Facility: CLINIC | Age: 49
End: 2024-07-24
Payer: COMMERCIAL

## 2024-07-24 DIAGNOSIS — G62.9 NEUROPATHY: Primary | ICD-10-CM

## 2024-07-24 PROCEDURE — 95886 MUSC TEST DONE W/N TEST COMP: CPT | Performed by: PHYSICAL MEDICINE & REHABILITATION

## 2024-07-24 PROCEDURE — 95909 NRV CNDJ TST 5-6 STUDIES: CPT | Performed by: PHYSICAL MEDICINE & REHABILITATION

## 2024-07-24 NOTE — PROCEDURES
[facility]  [address] [place] [address]        Full Name: Tatiana Cole Gender: Female  Patient ID: [id number] YOB: 1975      Visit Date: 7/24/2024 11:25 AM  Age: 48 Years  Examining Physician: Dr. Mohit Kirkland  Referring Physician: Dr. Eric Mccallum  Height: 5 feet 6 inch  Weight: 145 lbs  History: EMG RLE- pt comes in with T/N radiating down R leg. Denies pain. Admits weakness. Denies thyroid. Denies diabetes. Denies daily alcohol.      Sensory NCS      Nerve / Sites Rec. Site Onset Lat Peak Lat NP Amp PP Amp Segments Distance Velocity Comment     ms ms µV µV  cm m/s    R Sural - (Antidromic)      Calf Ankle 3.23 4.11 19.5 20.2 Calf - Ankle 14 43    R Superficial peroneal - (Antidromic)      Lat leg Ankle 3.23 3.88 10.7 12.1 Lat leg - Ankle 14 43       Lat leg Ankle 3.33 3.96 11.3 13.4           Motor NCS      Nerve / Sites Muscle Latency Amplitude Segments Dist. Lat Diff Velocity Comments     ms mV  cm ms m/s    R Peroneal - EDB      Ankle EDB 3.83 8.9 Ankle - EDB 8         B. Fib Head EDB 9.56 8.0 B. Fib Head - Ankle 30 5.73 52.4       A. Fib Head EDB 11.69 8.3 A. Fib Head - B. Fib Head 10 2.12 47.1    R Tibial - AH      Ankle AH 3.83 12.2 Ankle - AH 8         Knee AH 11.92 8.1 Knee - Ankle 37 8.08 45.8    R Femoral - Vastus Med      B. Ing Lig Vastus Med 6.00 8.6 B. Ing Lig - Vastus Med 31         A. Ing Lig Vastus Med 6.71 8.8 A. Ing Lig - B. Ing Lig 5 0.71 70.6        EMG Summary Table     Spontaneous MUAP Recruitment   Muscle IA Fib PSW Fasc H.F. Amp Dur. PPP Pattern   R. Vastus medialis N None None None None N N N N   R. Tensor fasciae latae N None None None None N N N N   R. Tibialis anterior N None None None None N N N N   R. Tibialis posterior N None None None None N N N N   R. Gastrocnemius (Medial head) N None None None None N N N N   R. Peroneus longus N None None None None N N N N       Summary    The motor conduction test was performed on 3 nerve(s).  The results were normal in 2 nerve(s): R Peroneal - EDB, R Tibial - AH. Findings were unremarkable in 1 nerve(s): R Femoral - Vastus Med. There were no results outside the specified normal range.      The sensory conduction test was normal in all 2 of the tested nerves: R Sural - (Antidromic), R Superficial peroneal - (Antidromic).    The needle EMG study was normal in all 6 tested muscles: R. Vastus medialis, R. Tensor fasciae latae, R. Tibialis anterior, R. Tibialis posterior, R. Gastrocnemius (Medial head), R. Peroneus longus.        Tatiana Ownes [id number] 7/24/2024 11:25 AM     3 of 3    Conclusion:     This is a normal study.    1.  There is no electrodiagnostic evidence to support a femoral neuropathy which is the main nerve in question.  2.  There is no evidence to support a right-sided tibial or peroneal mononeuropathy.  3.  There is no evidence to support a right-sided lumbosacral radiculopathy.  4.  There is no evidence to support a length-dependent peripheral polyneuropathy.        Mohit Kirkland DO  Interventional Spine and Sports Medicine Specialist   Physical Medicine and Rehabilitation  [address] [address]    [address] [place] [address]        ________________________  Dr. Mohit Kirkland      Tatiana Ownes [id number] 7/24/2024 11:25 AM     3 of 3

## 2025-01-28 ENCOUNTER — OFFICE VISIT (OUTPATIENT)
Dept: FAMILY MEDICINE CLINIC | Facility: CLINIC | Age: 50
End: 2025-01-28
Payer: COMMERCIAL

## 2025-01-28 VITALS
OXYGEN SATURATION: 98 % | SYSTOLIC BLOOD PRESSURE: 110 MMHG | HEIGHT: 66 IN | DIASTOLIC BLOOD PRESSURE: 80 MMHG | BODY MASS INDEX: 26.84 KG/M2 | HEART RATE: 76 BPM | RESPIRATION RATE: 16 BRPM | WEIGHT: 167 LBS

## 2025-01-28 DIAGNOSIS — Z12.31 ENCOUNTER FOR SCREENING MAMMOGRAM FOR MALIGNANT NEOPLASM OF BREAST: ICD-10-CM

## 2025-01-28 DIAGNOSIS — E66.3 OVERWEIGHT (BMI 25.0-29.9): Primary | ICD-10-CM

## 2025-01-28 LAB
ATRIAL RATE: 63 BPM
P AXIS: 49 DEGREES
P-R INTERVAL: 176 MS
Q-T INTERVAL: 394 MS
QRS DURATION: 80 MS
QTC CALCULATION (BEZET): 403 MS
R AXIS: 44 DEGREES
T AXIS: 28 DEGREES
VENTRICULAR RATE: 63 BPM

## 2025-01-28 PROCEDURE — 93000 ELECTROCARDIOGRAM COMPLETE: CPT | Performed by: NURSE PRACTITIONER

## 2025-01-28 PROCEDURE — 3074F SYST BP LT 130 MM HG: CPT | Performed by: NURSE PRACTITIONER

## 2025-01-28 PROCEDURE — 3079F DIAST BP 80-89 MM HG: CPT | Performed by: NURSE PRACTITIONER

## 2025-01-28 PROCEDURE — 99214 OFFICE O/P EST MOD 30 MIN: CPT | Performed by: NURSE PRACTITIONER

## 2025-01-28 PROCEDURE — 3008F BODY MASS INDEX DOCD: CPT | Performed by: NURSE PRACTITIONER

## 2025-01-28 RX ORDER — PHENTERMINE HYDROCHLORIDE 37.5 MG/1
37.5 TABLET ORAL
Qty: 30 TABLET | Refills: 0 | Status: SHIPPED | OUTPATIENT
Start: 2025-01-28 | End: 2025-02-27

## 2025-01-28 NOTE — PROGRESS NOTES
[facility]   PROGRESS NOTE  Chief Complaint:   Chief Complaint   Patient presents with    Weight Problem       HPI:   This is a 49 year old female coming in for weight gain , hot flashes   Tried estrogen cream unable to tolerate due to  migraines.   Would like to loose some weight   Pt presents for obesity problem.  Pt has it for: a while after hysterectomy   Risk factors: sedentary life style./ hormone changes   Makes better: low calories diet and physical activities.  Makes worse: sugar craving and no exercise.  Pt has no following issues:  Cushing disease, thyroid disease, PCOS, sleep apnea, DM.  Associate symptoms: excessive weight, inability to loose weights.    Results for orders placed or performed in visit on 02/19/24   Comp Metabolic Panel (14) [E]    Collection Time: 02/19/24  8:57 AM   Result Value Ref Range    Glucose 106 (H) 70 - 99 mg/dL    Sodium 139 136 - 145 mmol/L    Potassium 4.7 3.5 - 5.1 mmol/L    Chloride 109 98 - 112 mmol/L    CO2 29.0 21.0 - 32.0 mmol/L    Anion Gap 1 0 - 18 mmol/L    BUN 13 9 - 23 mg/dL    Creatinine 0.79 0.55 - 1.02 mg/dL    Calcium, Total 10.7 (H) 8.5 - 10.1 mg/dL    Calculated Osmolality 289 275 - 295 mOsm/kg    eGFR-Cr 91 >=60 mL/min/1.73m2    AST 15 15 - 37 U/L    ALT 25 13 - 56 U/L    Alkaline Phosphatase 96 39 - 100 U/L    Bilirubin, Total 0.5 0.1 - 2.0 mg/dL    Total Protein 7.6 6.4 - 8.2 g/dL    Albumin 4.0 3.4 - 5.0 g/dL    Globulin  3.6 2.8 - 4.4 g/dL    A/G Ratio 1.1 1.0 - 2.0    Patient Fasting for CMP? Yes    RBC Morphology Scan    Collection Time: 02/19/24  8:57 AM   Result Value Ref Range    RBC Morphology See morphology below (A) Normal, Slide reviewed, see previous RBC morphology.    Platelet Morphology Normal Normal    Hypochromia 1+      Microcytosis 1+     Ferritin [E]    Collection Time: 02/19/24  8:57 AM   Result Value Ref Range    Ferritin 3.2 (L) 18.0 - 340.0 ng/mL   Iron And Tibc    Collection Time: 02/19/24  8:57 AM   Result Value Ref  Range    Iron 63 50 - 170 ug/dL    Transferrin 369 (H) 200 - 360 mg/dL    Total Iron Binding Capacity 550 (H) 240 - 450 ug/dL    % Saturation 11 (L) 15 - 50 %   CBC W/ DIFFERENTIAL    Collection Time: 24  8:57 AM   Result Value Ref Range    WBC 5.7 4.0 - 11.0 x10(3) uL    RBC 5.03 3.80 - 5.30 x10(6)uL    HGB 11.9 (L) 12.0 - 16.0 g/dL    HCT 39.1 35.0 - 48.0 %    .0 150.0 - 450.0 10(3)uL    MCV 77.7 (L) 80.0 - 100.0 fL    MCH 23.7 (L) 26.0 - 34.0 pg    MCHC 30.4 (L) 31.0 - 37.0 g/dL    RDW 23.9 %    Neutrophil Absolute Prelim 3.31 1.50 - 7.70 x10 (3) uL    Neutrophil Absolute 3.31 1.50 - 7.70 x10(3) uL    Lymphocyte Absolute 1.75 1.00 - 4.00 x10(3) uL    Monocyte Absolute 0.44 0.10 - 1.00 x10(3) uL    Eosinophil Absolute 0.15 0.00 - 0.70 x10(3) uL    Basophil Absolute 0.02 0.00 - 0.20 x10(3) uL    Immature Granulocyte Absolute 0.01 0.00 - 1.00 x10(3) uL    Neutrophil % 58.3 %    Lymphocyte % 30.8 %    Monocyte % 7.7 %    Eosinophil % 2.6 %    Basophil % 0.4 %    Immature Granulocyte % 0.2 %       Past Medical History:    Abnormal Pap smear of cervix    Calculus of kidney    Migraines     Past Surgical History:   Procedure Laterality Date    Benign biopsy left  2015    Benign biopsy left  2015    Benign biopsy left  2016    Benign biopsy left  2016    Benign biopsy left  2016    Breast biopsy      x2    Colposcopy,bx cervix/endocerv curr  1997    nl    Excisional biopsy left      age 16    Total abdominal hysterectomy N/A 2023    Procedure: ABDOMINAL HYSTERECTOMY;  Surgeon: Manuel Lewis DO;  Location: [facility] MAIN OR     Social History:  Social History     Socioeconomic History    Marital status:    Tobacco Use    Smoking status: Former     Current packs/day: 0.00     Types: Cigarettes     Quit date:      Years since quittin.0    Smokeless tobacco: Never   Vaping Use    Vaping status: Never Used   Substance and Sexual Activity    Alcohol use: Not Currently     Alcohol/week: 0.0  standard drinks of alcohol    Drug use: No    Sexual activity: Yes     Partners: Male   Other Topics Concern    Caffeine Concern Yes    Exercise Yes     Social Drivers of Health      Received from [employer]    [facility]     Family History:  Family History   Problem Relation Age of Onset    Obesity Father     Heart Disorder Father         CHF    Diabetes Mother     Lipids Mother     Other (Other) Maternal Grandmother         DEMENTIA    Diabetes Maternal Grandfather     Other (Other) Paternal Grandmother         DEMENTIA    Heart Disorder Paternal Grandfather         AAA    Lipids Daughter      Allergies:  Allergies[1]  Current Meds:  Current Outpatient Medications   Medication Sig Dispense Refill    NON FORMULARY Take 2 tablets by mouth every morning. Homeopathic hormonal supplement        Counseling given: Not Answered       REVIEW OF SYSTEMS:   CONSTITUTIONAL:  Denies unusual weight gain/loss, fever, chills, or fatigue.  EENT:  Eyes:  Denies eye pain, visual loss, blurred vision, double vision or yellow sclerae. Ears, Nose, Throat:  Denies hearing loss, sneezing, congestion, runny nose or sore throat.  INTEGUMENTARY:  Denies rashes, itching, skin lesion, or excessive skin dryness.  CARDIOVASCULAR:  Denies chest pain, chest pressure, chest discomfort, palpitations, edema, dyspnea on exertion or at rest.  RESPIRATORY:  Denies shortness of breath, wheezing, cough or sputum.  GASTROINTESTINAL:  Denies abdominal pain, nausea, vomiting, constipation, diarrhea, or blood in stool.  MUSCULOSKELETAL:  Denies weakness, muscle aches, back pain, joint pain, swelling or stiffness.  NEUROLOGICAL:  Denies headache, seizures, dizziness, syncope, paralysis, ataxia, numbness or tingling in the extremities,change in bowel or bladder control.  HEMATOLOGIC:  Denies anemia, bleeding or bruising.  LYMPHATICS:  Denies enlarged nodes or history of splenectomy.  PSYCHIATRIC:  Denies depression or anxiety.  ENDOCRINOLOGIC:   Denies excessive sweating, cold or heat intolerance, polyuria or polydipsia.  ALLERGIES:  Denies allergic response, history of asthma, sneezing, hives, eczema or rhinitis.     EXAM:   /80   Pulse 76   Resp 16   Ht 5' 6\" (1.676 m)   Wt 167 lb (75.8 kg)   LMP 09/04/2023 (Exact Date)   SpO2 98%   BMI 26.95 kg/m²  Estimated body mass index is 26.95 kg/m² as calculated from the following:    Height as of this encounter: 5' 6\" (1.676 m).    Weight as of this encounter: 167 lb (75.8 kg).   Vital signs reviewed.Appears stated age, well groomed.  Physical Exam:  GEN:  Patient is alert, awake and oriented, in  no apparent distress.  HEENT:  Head:  Normocephalic, atraumatic Eyes: EOMI, PERRLA, no scleral icterus, conjunctivae clear bilaterally, no eye discharge Ears: External normal. Nose: patent, no nasal discharge Throat:  No tonsillar erythema or exudate.  Mouth:  No oral lesions or ulcerations, good dentition.  NECK: Supple, no CLAD, no JVD, no thyromegaly.  SKIN: No rashes, no skin lesion, no bruising, good turgor.  HEART:  Regular rate and rhythm, no murmurs, rubs or gallops.  LUNGS: Clear to auscultation bilterally, no rales/rhonchi/wheezing.  CHEST: No tenderness.  ABDOMEN:  Soft, nondistended, nontender, bowel sounds normal in all 4 quadrants, no masses, no hepatosplenomegaly.  BACK: No tenderness, no spasm, SLR test negative, FROM.  EXTREMITIES:  No edema, no cyanosis, no clubbing, FROM, 2+ dorsalis pedis pulses bilaterally.  NEURO:  No deficit, normal gait, strength and tone, sensory intact, normal reflexes.    ASSESSMENT AND PLAN:   Diagnoses and all orders for this visit:    Overweight (BMI 25.0-29.9)  -     EKG with interpretation and Report -IN OFFICE [33311]  -     Phentermine HCl 37.5 MG Oral Tab; Take 1 tablet (37.5 mg total) by mouth every morning before breakfast.         Meds & Refills for this Visit:  Requested Prescriptions      No prescriptions requested or ordered in this encounter        Health Maintenance:  Health Maintenance Due   Topic Date Due    Colorectal Cancer Screening  Never done    DTaP,Tdap,and Td Vaccines (1 - Tdap) Never done    Mammogram  04/21/2021    Pneumococcal Vaccine: 50+ Years (1 of 1 - PCV) Never done    Zoster Vaccines (1 of 2) Never done    Annual Physical  07/12/2024    COVID-19 Vaccine (3 - 2024-25 season) 09/01/2024    Influenza Vaccine (1) Never done    Annual Depression Screening  01/01/2025       Patient/Caregiver Education: Patient/Caregiver Education: There are no barriers to learning. Medical education done.   Outcome: Patient verbalizes understanding. Patient is notified to call with any questions, complications, allergies, or worsening or changing symptoms.  Patient is to call with any side effects or complications from the treatments as a result of today.     Problem List:  Patient Active Problem List   Diagnosis    Diffuse cystic mastopathy, unspecified laterality [N60.19]    Urinary retention    Uterine leiomyoma    Pre-op testing    Femoral nerve palsy, right       Ausra SISSY Albarado  1/28/2025  10:13 AM         [1]   Allergies  Allergen Reactions    Monosodium Glutamate OTHER (SEE COMMENTS)     migraines    Nitrates OTHER (SEE COMMENTS)     Migraines      Nuts OTHER (SEE COMMENTS)     Migraines      Peanuts OTHER (SEE COMMENTS)     Migraines      Phosphate Enema [Saline Laxative] OTHER (SEE COMMENTS)     migraine    Sulfites UNKNOWN    Yellow Dye OTHER (SEE COMMENTS)     migraines    Seasonal RASH     Poison ivy, mold

## 2025-02-17 ENCOUNTER — HOSPITAL ENCOUNTER (OUTPATIENT)
Dept: MAMMOGRAPHY | Age: 50
Discharge: HOME | End: 2025-02-17
Attending: NURSE PRACTITIONER
Payer: COMMERCIAL

## 2025-02-17 DIAGNOSIS — Z12.31 ENCOUNTER FOR SCREENING MAMMOGRAM FOR MALIGNANT NEOPLASM OF BREAST: ICD-10-CM

## 2025-02-17 PROCEDURE — 77063 BREAST TOMOSYNTHESIS BI: CPT | Performed by: NURSE PRACTITIONER

## 2025-02-17 PROCEDURE — 77067 SCR MAMMO BI INCL CAD: CPT | Performed by: NURSE PRACTITIONER

## 2025-02-18 ENCOUNTER — NURSE TRIAGE (OUTPATIENT)
Dept: FAMILY MEDICINE CLINIC | Facility: CLINIC | Age: 50
End: 2025-02-18

## 2025-02-18 NOTE — TELEPHONE ENCOUNTER
Action Requested: Summary for Provider     []  Critical Lab, Recommendations Needed  [] Need Additional Advice  [x]   FYI    []   Need Orders  [] Need Medications Sent to Pharmacy  []  Other     SUMMARY: Pt rescheduled for sooner evaluation for reported symptoms after starting Phentermine rx.    Reason for call: No chief complaint on file.  Onset: Few weeks    Notes:  - Started Phentermine 3 weeks ago  - Noticing canker sores throughout mouth, blisters to forehead hairline, scabs on her scalp  - States her tongue feels tender/swollen.  When asked if throat was closing, difficulty breathing, wheezing, lips swelling she declined.  - Doing Aquaphor for blisters, oral rinse for canker sores.  - 7/10, effecting her sleep, rinses are not helpful.  - Rescheduled OV for sooner available evaluation, she agreed to coming in sooner.  She has not taken medication yet today, advised her to refrain from taking until direction received from provider.  She agreed.     Future Appointments   Date Time Provider Department Center   2/19/2025  9:30 AM Halle Albarado APRN [facility]     Reason for Disposition   Patient wants to be seen    Protocols used: Mouth Symptoms-A-OH    Pt  message:    February 17, 2025  Tatiana Theodore to P [facility] Clinical Staff (supporting SISSY Chin)     2/17/25  8:19 PM  Also my face is red , blisters on my forehead at hairline   Eyes burn eyelids red  Scabbing on my scalp   Tatiana Theodore to P [facility] Clinical Staff (supporting SISSY Chin)     2/17/25  7:15 PM  I’m experiencing canker sores in my mouth   I’ve been using oral rinse  It’s not getting better   I don’t get canker sores   Is this a reaction to medication?

## 2025-02-19 ENCOUNTER — OFFICE VISIT (OUTPATIENT)
Dept: FAMILY MEDICINE CLINIC | Facility: CLINIC | Age: 50
End: 2025-02-19
Payer: COMMERCIAL

## 2025-02-19 ENCOUNTER — LAB ENCOUNTER (OUTPATIENT)
Dept: LAB | Age: 50
End: 2025-02-19
Attending: NURSE PRACTITIONER
Payer: COMMERCIAL

## 2025-02-19 VITALS
OXYGEN SATURATION: 98 % | HEART RATE: 64 BPM | HEIGHT: 66 IN | WEIGHT: 164 LBS | RESPIRATION RATE: 16 BRPM | BODY MASS INDEX: 26.36 KG/M2 | SYSTOLIC BLOOD PRESSURE: 100 MMHG | DIASTOLIC BLOOD PRESSURE: 70 MMHG

## 2025-02-19 DIAGNOSIS — N95.1 MENOPAUSAL SYMPTOMS: ICD-10-CM

## 2025-02-19 DIAGNOSIS — E66.3 OVERWEIGHT (BMI 25.0-29.9): ICD-10-CM

## 2025-02-19 DIAGNOSIS — B37.0 THRUSH: Primary | ICD-10-CM

## 2025-02-19 LAB
CORTIS SERPL-MCNC: 12.8 UG/DL
FSH SERPL-ACNC: 133.3 MIU/ML
INSULIN SERPL-ACNC: 2.5 MU/L (ref 3–25)
LH SERPL-ACNC: 74.4 MIU/ML

## 2025-02-19 PROCEDURE — 83002 ASSAY OF GONADOTROPIN (LH): CPT

## 2025-02-19 PROCEDURE — 3074F SYST BP LT 130 MM HG: CPT | Performed by: NURSE PRACTITIONER

## 2025-02-19 PROCEDURE — 83525 ASSAY OF INSULIN: CPT

## 2025-02-19 PROCEDURE — 82671 ASSAY OF ESTROGENS: CPT

## 2025-02-19 PROCEDURE — 99214 OFFICE O/P EST MOD 30 MIN: CPT | Performed by: NURSE PRACTITIONER

## 2025-02-19 PROCEDURE — 83520 IMMUNOASSAY QUANT NOS NONAB: CPT

## 2025-02-19 PROCEDURE — 3078F DIAST BP <80 MM HG: CPT | Performed by: NURSE PRACTITIONER

## 2025-02-19 PROCEDURE — 82533 TOTAL CORTISOL: CPT

## 2025-02-19 PROCEDURE — 36415 COLL VENOUS BLD VENIPUNCTURE: CPT

## 2025-02-19 PROCEDURE — 3008F BODY MASS INDEX DOCD: CPT | Performed by: NURSE PRACTITIONER

## 2025-02-19 PROCEDURE — 83001 ASSAY OF GONADOTROPIN (FSH): CPT

## 2025-02-19 RX ORDER — LIDOCAINE HYDROCHLORIDE 20 MG/ML
SOLUTION OROPHARYNGEAL
Qty: 100 ML | Refills: 0 | Status: SHIPPED | OUTPATIENT
Start: 2025-02-19

## 2025-02-19 NOTE — PROGRESS NOTES
HPI:     Chief Complaint   Patient presents with    Derm Problem     Blisters on tongue, face and ankles     Allergy symptoms.    The patient complains of allergic reaction to Phentermine , reports developed blisters on forehead and ankle , cancer sores in her mouth noticed one day ago. Feels her face is burning . . Has had it for  one week    Patient is frustrated with weight loss , reports nothing is working , gained 35 lb after hysterectomy and unable to loose weight. Reports intermitted fasting , changed her lifestyle 13 y ago  not eating sweets , carbs  mostly salads, drinks black coffee. Walks 4 miles per day , goes to the gym , reports very active and reports something going on with her body and would like to know. Unable to loose weight and getting frustrated with unable to loose the 35 lb.     Current Outpatient Medications   Medication Sig Dispense Refill    Phentermine HCl 37.5 MG Oral Tab Take 1 tablet (37.5 mg total) by mouth every morning before breakfast. 30 tablet 0    NON FORMULARY Take 2 tablets by mouth every morning. Homeopathic hormonal supplement        Past Medical History:    Abnormal Pap smear of cervix    Calculus of kidney    Migraines      Past Surgical History:   Procedure Laterality Date    Benign biopsy left  2015    Benign biopsy left  2015    Benign biopsy left  2016    Benign biopsy left  2016    Benign biopsy left  2016    Breast biopsy      x2    Colposcopy,bx cervix/endocerv curr  1997    nl    Excisional biopsy left      age 16    Total abdominal hysterectomy N/A 9/29/2023    Procedure: ABDOMINAL HYSTERECTOMY;  Surgeon: Manuel Lewis DO;  Location: [facility] MAIN OR      Family History   Problem Relation Age of Onset    Obesity Father     Heart Disorder Father         CHF    Diabetes Mother     Lipids Mother     Other (Other) Maternal Grandmother         DEMENTIA    Diabetes Maternal Grandfather     Other (Other) Paternal Grandmother         DEMENTIA    Heart Disorder Paternal  Grandfather         AAA    Lipids Daughter       Social History     Socioeconomic History    Marital status:    Tobacco Use    Smoking status: Former     Current packs/day: 0.00     Types: Cigarettes     Quit date:      Years since quittin.1    Smokeless tobacco: Never   Vaping Use    Vaping status: Never Used   Substance and Sexual Activity    Alcohol use: Not Currently     Alcohol/week: 0.0 standard drinks of alcohol    Drug use: No    Sexual activity: Yes     Partners: Male   Other Topics Concern    Caffeine Concern Yes    Exercise Yes     Social Drivers of Health      Received from [employer]    [facility]         REVIEW OF SYSTEMS:   GENERAL: feels well otherwise, no fever  SKIN: no rashes, no hives  EYES:denies blurred vision or double vision  HEENT: congested, no snoring, no hyposmia, no purulent rhinorrhea  CHEST: no chest pains.  LUNGS: denies shortness of breath with exertion or rest.No wheezing, no cough.  CARDIOVASCULAR: denies chest pain on exertion  GI: no nausea or abdominal pain  NEURO: no sleeping issues      EXAM:   /70   Pulse 64   Resp 16   Ht 5' 6\" (1.676 m)   Wt 164 lb (74.4 kg)   LMP 2023 (Exact Date)   SpO2 98%   BMI 26.47 kg/m²   GENERAL: well developed, in no apparent distress  SKIN: no rashes,no suspicious lesions  EYES:PERRL, EOMI, normal optic disk,conjunctiva are clear  HEENT: head atraumatic, normocephalic,TM wnl brenton,no erythema of the throat.Moist oral and throat mucosa.  NOSE- normal external nose.  No polyps, septum normal.  NECK: supple,no adenopathy  LUNGS: clear to auscultation  CARDIO: RRR without murmur  GI: good BS's,no masses, HSM or tenderness    ASSESSMENT AND PLAN:   Tatiana Theodore is a 49 year old female who presents with   Diagnoses and all orders for this visit:    Thrush  -     Lidocaine Viscous HCl 2 % Mouth/Throat Solution; 5-15ml swich the throat and spit    Overweight (BMI 25.0-29.9)  -     [employer]  [facility] - Ruthy Reich PA-C [id number] [place]  -     Endocrine Referral - In Network  -     Leptin, Serum; Future    Menopausal symptoms  -     Insulin [E]; Future  -     Cortisol [E]; Future  -     Estrogens Fractionated, S [E]; Future  -     FSH AND LH; Future  -     Endocrine Referral - In Network       Allergens avoidance d/w the pt. Rx as below.  The patient indicates understanding of these issues and agrees to the plan.  The patient is asked to return in one week if sx's persist or worsen.  F/u in as needed

## 2025-02-21 LAB
ESTRADIOL: <5 PG/ML
ESTRONE: 22 PG/ML

## 2025-02-22 LAB — LEPTIN: 5.9 NG/ML

## 2025-04-09 ENCOUNTER — OFFICE VISIT (OUTPATIENT)
Dept: PHYSICAL MEDICINE AND REHAB | Facility: CLINIC | Age: 50
End: 2025-04-09
Payer: COMMERCIAL

## 2025-04-09 VITALS — WEIGHT: 153 LBS | HEIGHT: 66 IN | BODY MASS INDEX: 24.59 KG/M2

## 2025-04-09 DIAGNOSIS — G56.03 CARPAL TUNNEL SYNDROME ON BOTH SIDES: Primary | ICD-10-CM

## 2025-04-09 PROCEDURE — 76942 ECHO GUIDE FOR BIOPSY: CPT | Performed by: PHYSICAL MEDICINE & REHABILITATION

## 2025-04-09 PROCEDURE — 3008F BODY MASS INDEX DOCD: CPT | Performed by: PHYSICAL MEDICINE & REHABILITATION

## 2025-04-09 PROCEDURE — 20526 THER INJECTION CARP TUNNEL: CPT | Performed by: PHYSICAL MEDICINE & REHABILITATION

## 2025-04-09 PROCEDURE — 99214 OFFICE O/P EST MOD 30 MIN: CPT | Performed by: PHYSICAL MEDICINE & REHABILITATION

## 2025-04-09 RX ORDER — TRIAMCINOLONE ACETONIDE 40 MG/ML
80 INJECTION, SUSPENSION INTRA-ARTICULAR; INTRAMUSCULAR ONCE
Status: COMPLETED | OUTPATIENT
Start: 2025-04-09 | End: 2025-04-09

## 2025-04-09 RX ORDER — LIDOCAINE HYDROCHLORIDE 10 MG/ML
4 INJECTION, SOLUTION INFILTRATION; PERINEURAL ONCE
Status: COMPLETED | OUTPATIENT
Start: 2025-04-09 | End: 2025-04-09

## 2025-04-09 RX ADMIN — LIDOCAINE HYDROCHLORIDE 4 ML: 10 INJECTION, SOLUTION INFILTRATION; PERINEURAL at 13:03:00

## 2025-04-09 RX ADMIN — TRIAMCINOLONE ACETONIDE 80 MG: 40 INJECTION, SUSPENSION INTRA-ARTICULAR; INTRAMUSCULAR at 13:04:00

## 2025-04-09 NOTE — PROGRESS NOTES
RETURN PATIENT VISIT    CHIEF COMPLAINT  Bilateral carpal tunnel syndrome follow-up    INTERVAL HISTORY  Tatiana Theodore is a 49 year old who was last seen in clinic on 6/20/2020 for following up on bilateral carpal tunnel syndrome.  She previously underwent carpal tunnel injections in May 2024 which provided her with near complete relief of her symptoms until just recently.  Patient states that she has started to have numbness and tingling returning to the first 3 digits of her bilateral hands.    She is interested in intervention.    REVIEW OF SYSTEMS  Review of systems was completed with the patient today as pertinent to today's visit    PHYSICAL EXAMINATION  CONSTITUTIONAL: Well-appearing, in no apparent distress  EYES: No scleral icterus or conjunctival hemorrhage  CARDIOVASCULAR: Skin warm and well-perfused, no peripheral edema  RESPIRATORY: Breathing unlabored without accessory muscle use  PSYCHIATRIC: Alert, cooperative, appropriate mood and affect  SKIN: No lesions or rashes on exposed skin  MUSCULOSKELETAL: Positive Durkan's and Tinel's bilaterally.  Well-maintained  strength bilaterally, no clear muscular atrophy of the APB bilaterally.  Physical Exam        REVIEW OF PRIOR X-RAYS/STUDIES  No new imaging to review    IMPRESSION/DIAGNOSIS  1.   Encounter Diagnosis   Name Primary?    Carpal tunnel syndrome on both sides Yes         TREATMENT/PLAN  Carpal tunnel injections today, procedure note to follow.  Patient will follow-up as needed if successful.  Education was provided regarding the above impression/diagnosis and treatment options/plan were discussed.  All questions were answered during today's visit.  Patient will contact clinic if any other questions or concerns.          Mohit Kirkland DO  Interventional Spine and Sports Medicine Specialist   Physical Medicine and Rehabilitation  [address] [address]    [facility]  [address] [place] [address]

## (undated) DEVICE — TRAY CATH 16FR F INCLUDE BARDX IC COMPLT CARE

## (undated) DEVICE — LAPAROTOMY: Brand: MEDLINE INDUSTRIES, INC.

## (undated) DEVICE — CONTAINER,SPECIMEN,OR STERILE,4OZ: Brand: MEDLINE

## (undated) DEVICE — OR TOWEL, 17" X 26" STERILE, BLUE: Brand: PREMIERPRO

## (undated) DEVICE — SOLUTION IRRIG 1000ML 0.9% NACL USP BTL

## (undated) DEVICE — SOLUTION IRRIG 1000ML ST H2O AQUALITE PLAS

## (undated) DEVICE — DRAPE,ROBOTICS,STERILE: Brand: MEDLINE

## (undated) DEVICE — GAMMEX® PI HYBRID SIZE 7.5, STERILE POWDER-FREE SURGICAL GLOVE, POLYISOPRENE AND NEOPRENE BLEND: Brand: GAMMEX

## (undated) DEVICE — SUTURE VICRYL 0 J906G

## (undated) DEVICE — SUTURE PDS II SZ 4-0 L27IN ABSRB VLT SH L26MM

## (undated) DEVICE — SEPRAFILM ADHESION BARRIER (MEMBRANE) IS A STERILE, BIORESORBABLE, TRANSLUCENT ADHESION BARRIER COMPOSED OF TWO ANIONIC POLYSACCHARIDES, SODIUM HYALURONATE (HA) AND CARBOXYMETHYLCELLULOSE (CMC).: Brand: SEPRAFILM

## (undated) DEVICE — SUTURE VCRL SZ 0 L54IN ABSRB UD POLYGLACTIN

## (undated) DEVICE — FLEXIBLE YANKAUER,MEDIUM TIP, NO VACUUM CONTROL: Brand: ARGYLE

## (undated) DEVICE — PROXIMATE SKIN STAPLERS (35 WIDE) CONTAINS 35 STAINLESS STEEL STAPLES (FIXED HEAD): Brand: PROXIMATE

## (undated) DEVICE — APPLICATOR SKIN PREP 26ML HI LT ORNG 2% CHG

## (undated) DEVICE — ABSORBABLE WOUND CLOSURE DEVICE: Brand: V-LOC 90

## (undated) DEVICE — SUT VICRYL 0 27

## (undated) DEVICE — SUTURE PDS II SZ 1 L96IN ABSRB VLT XLH L70MM

## (undated) DEVICE — DRAPE SHEET LG

## (undated) DEVICE — ENCORE® LATEX MICRO SIZE 7, STERILE LATEX POWDER-FREE SURGICAL GLOVE: Brand: ENCORE

## (undated) DEVICE — JELLY LUBRICANT E-Z 4OZ TUBE

## (undated) DEVICE — 3M™ TEGADERM™ TRANSPARENT FILM DRESSING, 1626W, 4 IN X 4-3/4 IN (10 CM X 12 CM), 50 EACH/CARTON, 4 CARTON/CASE: Brand: 3M™ TEGADERM™

## (undated) DEVICE — 3M™ MEDITPORE™ SOFT CLOTH TAPE 6 IN X 10 YD 12 ROLLS/CASE 2966: Brand: 3M™ MEDIPORE™

## (undated) DEVICE — SUTURE PLAIN GUT 2-0 CT

## (undated) NOTE — LETTER
AUTHORIZATION FOR SURGICAL OPERATION OR OTHER PROCEDURE    1. I hereby authorize Dr. Mohit Kirkland and the [facility] staff assigned to my case to perform the following operation and/or procedure at the [facility]:    Bilateral carpal tunnel injections, ultrasound guidance, local anesthesia       2.  My physician has explained the nature and purpose of the operation or other procedure, possible alternative methods of treatment, the risks involved, and the possibility of complication to me.  I acknowledge that no guarantee has been made as to the result that may be obtained.  3.  I recognize that, during the course of this operation, or other procedure, unforseen conditions may necessitate additional or different procedure than those listed above.  I, therefore, further authorize and request that the above named physician, his/her physician assistants or designees perform such procedures as are, in his/her professional opinion, necessary and desirable.  4.  Any tissue or organs removed in the operation or other procedure may be disposed of by and at the discretion of the [facility] staff and [facility].  5.  I understand that in the event of a medical emergency, I will be transported by local paramedics to [facility] or other hospital emergency department.  6.  I certify that I have read and fully understand the above consent to operation and/or other procedure.    7.  I acknowledge that my physician has explained sedation/analgesia administration to me including the risks and benefits.  I consent to the administration of sedation/analgesia as may be necessary or desirable in the judgement of my physician.    Witness signature: ___________________________________________________ Date:  ______/______/_____                    Time:  ________ A.M.  P.MEvangelina Theodore  9/15/1975  [id number]         Patient signature:   ___________________________________________________              Statement of Physician  My signature below affirms that prior to the time of the procedure, I have explained to the patient and/or his/her guardian, the risks and benefits involved in the proposed treatment and any reasonable alternative to the proposed treatment.  I have also explained the risks and benefits involved in the refusal of the proposed treatment and have answered the patient's questions.                        Date:  ______/______/_______  Provider                      Signature:  __________________________________________________________       Time:  ___________ AGEOVANNY NEGRO

## (undated) NOTE — LETTER
23    Re: Mary Grace Moreno  : 9/15/1975    To Whom It May Concern:  Mary Grace Moreno is under my care. Please excuse her from work on 23  If you have any questions concerning this letter, please feel free to contact my office.       Sincerely yours,      Luis Griffin MD

## (undated) NOTE — LETTER
Date: 2025      Patient Name: Tatiana Theodore      : 9/15/1975        Thank you for choosing [facility] as your health care provider. Your physician has deemed the following medical service(s) necessary. However, your insurance plan may not pay for all of your health care and costs and may deny payment for this service. The fact that your insurance plan does not pay for an item or service does not mean you should not receive it. The purpose of this form is to help you make an informed decision about whether or not you want to receive this service(s) that may not be paid for by your insurance plan.    CPT Code Description     Cost     Bilateral carpal tunnel injections, ultrasound guidance, local anesthesia         I understand that the above mentioned service(s) or supply may not be covered by my insurance company. I agree to be financially responsible for the cost of this service or supply in the event of my insurance denies payment as a non-covered benefit.        ______________________________________________________________________  Signature of Patient or Patient's Representative  Relationship  Date    ______________________________________________________________________  Signature of Witness to signing of form   Printed Name

## (undated) NOTE — LETTER
24          Tatiana Theodore  :  9/15/1975      To Whom It May Concern:    This patient was seen in our office on 24 .      If this office may be of further assistance, please do not hesitate to contact us.      Sincerely,

## (undated) NOTE — LETTER
AUTHORIZATION FOR SURGICAL OPERATION OR OTHER PROCEDURE    1. I hereby authorize Dr. Mohit Kirkland and the [facility] staff assigned to my case to perform the following operation and/or procedure at the [facility]:    Bilateral carpal tunnel injections, ultrasound guidance, local anesthesia     2.  My physician has explained the nature and purpose of the operation or other procedure, possible alternative methods of treatment, the risks involved, and the possibility of complication to me.  I acknowledge that no guarantee has been made as to the result that may be obtained.  3.  I recognize that, during the course of this operation, or other procedure, unforseen conditions may necessitate additional or different procedure than those listed above.  I, therefore, further authorize and request that the above named physician, his/her physician assistants or designees perform such procedures as are, in his/her professional opinion, necessary and desirable.  4.  Any tissue or organs removed in the operation or other procedure may be disposed of by and at the discretion of the [facility] staff and [facility].  5.  I understand that in the event of a medical emergency, I will be transported by local paramedics to [facility] or other hospital emergency department.  6.  I certify that I have read and fully understand the above consent to operation and/or other procedure.    7.  I acknowledge that my physician has explained sedation/analgesia administration to me including the risks and benefits.  I consent to the administration of sedation/analgesia as may be necessary or desirable in the judgement of my physician.    Witness signature: ___________________________________________________ Date:  ______/______/_____                    Time:  ________ A.M.  P.M.       Patient Name:  Tatiana Theodore  9/15/1975  [id number]       Patient signature:   ___________________________________________________                   Statement of Physician  My signature below affirms that prior to the time of the procedure, I have explained to the patient and/or his/her guardian, the risks and benefits involved in the proposed treatment and any reasonable alternative to the proposed treatment.  I have also explained the risks and benefits involved in the refusal of the proposed treatment and have answered the patient's questions.                        Date:  ______/______/_______  Provider                      Signature:  __________________________________________________________       Time:  ___________ AGEOVANNY NEGRO

## (undated) NOTE — LETTER
[address], [place]  Authorization for Invasive Procedures  Date: ***           Time: ***    I hereby authorize ***, my physician and his/her assistants (if applicable), which may include medical students, residents, and/or fellows, to perform the following surgical operation/ procedure and administer such anesthesia as may be determined necessary by my physician: *** on [place]  2. I recognize that during the surgical operation/procedure, unforeseen conditions may necessitate additional or different procedures than those listed above. I, therefore, further authorize and request that the above-named surgeon, assistants, or designees perform such procedures as are, in their judgment, necessary and desirable. 3.   My surgeon/physician has discussed prior to my surgery the potential benefits, risks and side effects of this procedure; the likelihood of achieving goals; and potential problems that might occur during recuperation. They also discussed reasonable alternatives to the procedure, including risks, benefits, and side effects related to the alternatives and risks related to not receiving this procedure. I have had all my questions answered and I acknowledge that no guarantee has been made as to the result that may be obtained. 4.   Should the need arise during my operation/procedure, which includes change of level of care prior to discharge, I also consent to the administration of blood and/or blood products. Further, I understand that despite careful testing and screening of blood or blood products by collecting agencies, I may still be subject to ill effects as a result of receiving a blood transfusion and/or blood products. The following are some, but not all, of the potential risks that can occur: fever and allergic reactions, hemolytic reactions, transmission of diseases such as Hepatitis, AIDS and Cytomegalovirus (CMV) and fluid overload.   In the event that I wish to have an autologous transfusion of my own blood, or a directed donor transfusion, I will discuss this with my physician. Check only if Refusing Blood or Blood Products  I understand refusal of blood or blood products as deemed necessary by my physician may have serious consequences to my condition to include possible death. I hereby assume responsibility for my refusal and release the hospital, its personnel, and my physicians from any responsibility for the consequences of my refusal.         o  Refuse         5. I authorize the use of any specimen, organs, tissues, body parts or foreign objects that may be removed from my body during the operation/procedure for diagnosis, research or teaching purposes and their subsequent disposal by hospital authorities. I also authorize the release of specimen test results and/or written reports to my treating physician on the hospital medical staff or other referring or consulting physicians involved in my care, at the discretion of the Pathologist or my treating physician. 6.   I consent to the photographing or videotaping of the operations or procedures to be performed, including appropriate portions of my body for medical, scientific, or educational purposes, provided my identity is not revealed by the pictures or by descriptive texts accompanying them. If the procedure has been photographed/videotaped, the surgeon will obtain the original picture, image, videotape or CD. The hospital will not be responsible for storage, release or maintenance of the picture, image, tape or CD.    7.   I consent to the presence of a  or observers in the operating room as deemed necessary by my physician or their designees. 8.   I recognize that in the event my procedure results in extended X-Ray/fluoroscopy time, I may develop a skin reaction. 9.  If I have a Do Not Attempt Resuscitation (DNAR) order in place, that status will be suspended while in the operating room, procedural suite, and during the recovery period unless otherwise explicitly stated by me (or a person authorized to consent on my behalf). The surgeon or my attending physician will determine when the applicable recovery period ends for purposes of reinstating the DNAR order. 10. Patients having a sterilization procedure: I understand that if the procedure is successful the results will be permanent and it will therefore be impossible for me to inseminate, conceive, or bear children. I also understand that the procedure is intended to result in sterility, although the result has not been guaranteed. 11. I acknowledge that my physician has explained sedation/analgesia administration to me including the risk and benefits I consent to the administration of sedation/analgesia as may be necessary or desirable in the judgment of my physician. I CERTIFY THAT I HAVE READ AND FULLY UNDERSTAND THE ABOVE CONSENT TO OPERATION and/or OTHER PROCEDURE.        ____________________________________       _________________________________      ______________________________  Signature of Patient         Signature of Responsible Person        Printed Name of Responsible Person        ____________________________________      _________________________________      ______________________________       Signature of Witness          Relationship to Patient                       Date                                       Time    Patient Name: Gerson Abdullahi     : 9/15/1975                 Printed: 2023      Medical Record #: [id number]                      Page 1 of 2          [place] My signature below affirms that prior to the time of the procedure; I have explained to the patient and/or his/her legal representative, the risks and benefits involved in the proposed treatment and any reasonable alternative to the proposed treatment.  I have also explained the risks and benefits involved in refusal of the proposed treatment and alternatives to the proposed treatment and have answered the patient's questions.  If I have a significant financial interest in a co-management agreement or a significant financial interest in any product or implant, or other significant relationship used in this procedure/surgery, I have disclosed this and had a discussion with my patient.     _______________________________________________________________ _____________________________  Ezio Willard  Physician)                                                                                         (Date)                                   (Time)    Patient Name: Kathy Rodriguez     : 9/15/1975                 Printed: 2023      Medical Record #: [id number]                      Page 2 of 2

## (undated) NOTE — Clinical Note
TCM assessment completed with patient. A TE has been placed to clinical staff regarding an appointment. Thank you.

## (undated) NOTE — LETTER
Date: May 1, 2024      Patient Name: Tatiana Theodore      : 9/15/1975        Thank you for choosing [facility] as your health care provider. Your physician has deemed the following medical service(s) necessary. However, your insurance plan may not pay for all of your health care and costs and may deny payment for this service. The fact that your insurance plan does not pay for an item or service does not mean you should not receive it. The purpose of this form is to help you make an informed decision about whether or not you want to receive this service(s) that may not be paid for by your insurance plan.    CPT Code Description     Cost     Bilateral carpal tunnel injections, ultrasound guidance, local anesthesia     _________ ______________________________ _____________      _________ ______________________________ _____________      I understand that the above mentioned service(s) or supply may not be covered by my insurance company. I agree to be financially responsible for the cost of this service or supply in the event of my insurance denies payment as a non-covered benefit.        ______________________________________________________________________  Signature of Patient or Patient's Representative  Relationship  Date    ______________________________________________________________________  Signature of Witness to signing of form   Printed Name

## (undated) NOTE — LETTER
Date & Time: 7/9/2022, 3:50 AM  Patient: Harini Reyes  Encounter Provider(s):    Jessee Nieves DO       To Whom It May Concern:    Nils Gee was seen and treated in our department on 7/9/2022. She should not return to work until 7/11/2022.     If you have any questions or concerns, please do not hesitate to call.        _____________________________  Physician/APC Signature

## (undated) NOTE — LETTER
Date: 9/21/2023    Patient Name: Kathy Rodriguez          To Whom it may concern: The above patient was seen at the [facility] for treatment of a medical condition.           Sincerely,    SISSY Yang

## (undated) NOTE — LETTER
Date & Time: 7/20/2023, 6:18 AM  Patient: Matt Sen  Encounter Provider(s):    Lexi Bright DO       To Whom It May Concern:    Fannie Romero was seen and treated in our department on 7/20/2023. She can return to work.     If you have any questions or concerns, please do not hesitate to call.        _____________________________  Physician/APC Signature

## (undated) NOTE — Clinical Note
Date: 1/28/2025    Patient Name: Tatiana Theodore          To Whom it may concern:    This letter has been written at the patient's request. The above patient was seen at [facility] for treatment of a medical condition.    This patient should be excused from attending work/school from *** through ***.    The patient may return to work/school on *** with the following limitations ***.        Sincerely,    SISSY Chin

## (undated) NOTE — LETTER
Date & Time: 7/20/2023, 6:24 AM  Patient: Tara Patel  Encounter Provider(s):    Chan Stock DO       To Whom It May Concern:    Vinny De La Cruz was seen and treated in our department on 7/20/2023. She should not return to work until 7/21/23 .     If you have any questions or concerns, please do not hesitate to call.        _____________________________  Physician/APC Signature